# Patient Record
Sex: FEMALE | Race: WHITE | NOT HISPANIC OR LATINO | Employment: OTHER | URBAN - METROPOLITAN AREA
[De-identification: names, ages, dates, MRNs, and addresses within clinical notes are randomized per-mention and may not be internally consistent; named-entity substitution may affect disease eponyms.]

---

## 2017-01-19 RX ORDER — GABAPENTIN 300 MG/1
300 CAPSULE ORAL 2 TIMES DAILY
Qty: 180 CAPSULE | Refills: 2 | Status: SHIPPED | OUTPATIENT
Start: 2017-01-19 | End: 2017-05-24

## 2017-01-19 NOTE — TELEPHONE ENCOUNTER
Spoke with the patient.  She notes that the gabapentin 100 mg BID has been helping.  She also notes that she would like a stronger dose to see if she can get more relief.    Advised the patient to increase her gabapentin to 300 mg BID. Also advised that she could do 100/300 if she begins having too many side effects.    She will call back next week with an update.

## 2017-02-22 ENCOUNTER — OFFICE VISIT (OUTPATIENT)
Dept: NEUROSURGERY | Facility: CLINIC | Age: 72
End: 2017-02-22

## 2017-02-22 VITALS
DIASTOLIC BLOOD PRESSURE: 81 MMHG | WEIGHT: 106 LBS | SYSTOLIC BLOOD PRESSURE: 152 MMHG | HEART RATE: 70 BPM | HEIGHT: 63 IN | BODY MASS INDEX: 18.78 KG/M2

## 2017-02-22 DIAGNOSIS — M41.86 OTHER FORMS OF SCOLIOSIS, LUMBAR REGION: ICD-10-CM

## 2017-02-22 DIAGNOSIS — M48.061 STENOSIS, SPINAL, LUMBAR: Primary | ICD-10-CM

## 2017-02-22 PROCEDURE — 99213 OFFICE O/P EST LOW 20 MIN: CPT | Performed by: NEUROLOGICAL SURGERY

## 2017-02-22 RX ORDER — ATORVASTATIN CALCIUM 20 MG/1
TABLET, FILM COATED ORAL
COMMUNITY
Start: 2017-01-03 | End: 2017-05-25 | Stop reason: SDUPTHER

## 2017-02-22 NOTE — PROGRESS NOTES
Subjective   Patient ID: Giselle Bundy is a 71 y.o. female is here today for follow-up of back pain.    At the patient's last visit, it was recommended that she continue on gabapentin 300 mg BID and she was encouraged to go back to the gym and start doing low impact exercises.    Today, the patient notes that she continues on gabapentin 300 mg BID and notes that this really helps her pain.    She notes that she does do water aerobics at the gym, but doesn't really go all the time.     She notes that she continues to do a lot of walking, noting that she will go about a mile, up and down hill.  She notes that when she gets home from walking, she has increased pain in her left hip.    She notes that when her dosage of gabapentin is increased, that she has increased side effects of lethargy.    Back Pain   This is a chronic problem. The current episode started more than 1 month ago. The problem occurs daily. The problem has been gradually improving since onset. Pertinent negatives include no fever.       The following portions of the patient's history were reviewed and updated as appropriate: allergies, current medications, past family history, past medical history, past social history, past surgical history and problem list.    Review of Systems   Constitutional: Negative for fever.   Musculoskeletal: Positive for back pain.   Neurological: Negative for syncope.   All other systems reviewed and are negative.    She is with her .  The increase dose of gabapentin at 300 mg twice a day seems to be helping a bit.  But she continues to have left buttock pain and occasionally left leg pain when walking.  She wants to try the epidural blocks which isn't unreasonable.  She has as lateral recess stenosis at L4-L5 I like to hold off and avoid surgery if possible but it comes down to it, we would need to do a myelogram to get a better anatomical picture.  But we'll try epidural blocks and have her stay on the gabapentin  as stated for now.  I went over guidelines for exercise and told to try exercises that involve being the seated position like recumbent bicycle.    Objective   Physical Exam   Constitutional: She is oriented to person, place, and time. She appears well-developed and well-nourished.   HENT:   Head: Normocephalic and atraumatic.   Eyes: Conjunctivae and EOM are normal. Pupils are equal, round, and reactive to light.   Fundoscopic exam:       The right eye shows no papilledema. The right eye shows venous pulsations.        The left eye shows no papilledema. The left eye shows venous pulsations.   Neck: Carotid bruit is not present.   Neurological: She is oriented to person, place, and time. She has a normal Finger-Nose-Finger Test and a normal Heel to Shin Test. Gait normal.   Reflex Scores:       Tricep reflexes are 2+ on the right side and 2+ on the left side.       Bicep reflexes are 2+ on the right side and 2+ on the left side.       Brachioradialis reflexes are 2+ on the right side and 2+ on the left side.       Patellar reflexes are 2+ on the right side and 2+ on the left side.       Achilles reflexes are 2+ on the right side and 2+ on the left side.  Psychiatric: Her speech is normal.     Neurologic Exam     Mental Status   Oriented to person, place, and time.   Registration of memory: Good recent and remote memory.   Attention: normal. Concentration: normal.   Speech: speech is normal   Level of consciousness: alert  Knowledge: consistent with education.     Cranial Nerves     CN II   Visual fields full to confrontation.   Visual acuity: normal    CN III, IV, VI   Pupils are equal, round, and reactive to light.  Extraocular motions are normal.     CN V   Facial sensation intact.   Right corneal reflex: normal  Left corneal reflex: normal    CN VII   Facial expression full, symmetric.   Right facial weakness: none  Left facial weakness: none    CN VIII   Hearing: intact    CN IX, X   Palate: symmetric    CN XI    Right sternocleidomastoid strength: normal  Left sternocleidomastoid strength: normal    CN XII   Tongue: not atrophic  Tongue deviation: none    Motor Exam   Muscle bulk: normal  Right arm tone: normal  Left arm tone: normal  Right leg tone: normal  Left leg tone: normal    Strength   Strength 5/5 except as noted.     Sensory Exam   Light touch normal.     Gait, Coordination, and Reflexes     Gait  Gait: normal    Coordination   Finger to nose coordination: normal  Heel to shin coordination: normal    Reflexes   Right brachioradialis: 2+  Left brachioradialis: 2+  Right biceps: 2+  Left biceps: 2+  Right triceps: 2+  Left triceps: 2+  Right patellar: 2+  Left patellar: 2+  Right achilles: 2+  Left achilles: 2+  Right : 2+  Left : 2+      Assessment/Plan   Independent Review of Radiographic Studies:    I reviewed her previous MRI done late last year which did show some thoraco-lumbar scoliosis and lateral recess stenosis on the left at L4-L5.  I agree with the report.      Medical Decision Making:    She will continue her gabapentin at 300 mg twice a day and we'll try some lumbar epidural blocks.  She will come back to the office in 3 months.  I gave her some guidelines on exercise and asked her to try the recumbent bicycle as a form of cardiovascular exercises.  Will regroup in 3 months.      Giselle was seen today for back pain.    Diagnoses and all orders for this visit:    Stenosis, spinal, lumbar  -     Epidural Block    Other forms of scoliosis, lumbar region  -     Epidural Block    Return in about 3 months (around 5/22/2017).

## 2017-03-07 ENCOUNTER — TELEPHONE (OUTPATIENT)
Dept: NEUROSURGERY | Facility: CLINIC | Age: 72
End: 2017-03-07

## 2017-03-07 NOTE — TELEPHONE ENCOUNTER
Spoke with the patient.  She notes that she is scheduled for TORI next week and also wants a hip injection from her orthopedic physician.  I recommended she call her orthopedic surgeon and let him know she is scheduled for TORI and figure out if he is also willing to do a hip injection.     She agreed with this plan and will call back with any other issues or concerns.

## 2017-03-16 ENCOUNTER — HOSPITAL ENCOUNTER (OUTPATIENT)
Dept: GENERAL RADIOLOGY | Facility: HOSPITAL | Age: 72
Discharge: HOME | End: 2017-03-16

## 2017-03-16 ENCOUNTER — TRANSCRIBE ORDERS (OUTPATIENT)
Dept: PAIN MEDICINE | Facility: HOSPITAL | Age: 72
End: 2017-03-16

## 2017-03-16 ENCOUNTER — ANESTHESIA EVENT (OUTPATIENT)
Dept: PAIN MEDICINE | Facility: HOSPITAL | Age: 72
End: 2017-03-16

## 2017-03-16 ENCOUNTER — ANESTHESIA (OUTPATIENT)
Dept: PAIN MEDICINE | Facility: HOSPITAL | Age: 72
End: 2017-03-16

## 2017-03-16 ENCOUNTER — HOSPITAL ENCOUNTER (OUTPATIENT)
Dept: PAIN MEDICINE | Facility: HOSPITAL | Age: 72
Discharge: HOME | End: 2017-03-16
Attending: NEUROLOGICAL SURGERY | Admitting: NEUROLOGICAL SURGERY

## 2017-03-16 VITALS
OXYGEN SATURATION: 94 % | DIASTOLIC BLOOD PRESSURE: 89 MMHG | SYSTOLIC BLOOD PRESSURE: 113 MMHG | BODY MASS INDEX: 18.96 KG/M2 | RESPIRATION RATE: 16 BRPM | HEIGHT: 63 IN | WEIGHT: 107 LBS | TEMPERATURE: 97.7 F | HEART RATE: 66 BPM

## 2017-03-16 DIAGNOSIS — M41.86 OTHER FORMS OF SCOLIOSIS, LUMBAR REGION: ICD-10-CM

## 2017-03-16 DIAGNOSIS — R52 PAIN: ICD-10-CM

## 2017-03-16 DIAGNOSIS — M48.061 STENOSIS, SPINAL, LUMBAR: Primary | ICD-10-CM

## 2017-03-16 DIAGNOSIS — M48.061 STENOSIS, SPINAL, LUMBAR: ICD-10-CM

## 2017-03-16 PROCEDURE — 25010000002 METHYLPREDNISOLONE PER 80 MG: Performed by: ANESTHESIOLOGY

## 2017-03-16 PROCEDURE — 77003 FLUOROGUIDE FOR SPINE INJECT: CPT

## 2017-03-16 PROCEDURE — 25010000002 MIDAZOLAM PER 1 MG: Performed by: ANESTHESIOLOGY

## 2017-03-16 PROCEDURE — 25010000002 FENTANYL CITRATE (PF) 100 MCG/2ML SOLUTION: Performed by: ANESTHESIOLOGY

## 2017-03-16 PROCEDURE — C1755 CATHETER, INTRASPINAL: HCPCS

## 2017-03-16 PROCEDURE — 0 IOPAMIDOL 41 % SOLUTION: Performed by: ANESTHESIOLOGY

## 2017-03-16 RX ORDER — FENTANYL CITRATE 50 UG/ML
50 INJECTION, SOLUTION INTRAMUSCULAR; INTRAVENOUS
Status: DISCONTINUED | OUTPATIENT
Start: 2017-03-16 | End: 2017-03-17 | Stop reason: HOSPADM

## 2017-03-16 RX ORDER — BUPIVACAINE HYDROCHLORIDE 2.5 MG/ML
30 INJECTION, SOLUTION EPIDURAL; INFILTRATION; INTRACAUDAL ONCE
Status: DISCONTINUED | OUTPATIENT
Start: 2017-03-16 | End: 2017-03-17 | Stop reason: HOSPADM

## 2017-03-16 RX ORDER — SODIUM CHLORIDE 0.9 % (FLUSH) 0.9 %
1-10 SYRINGE (ML) INJECTION AS NEEDED
Status: DISCONTINUED | OUTPATIENT
Start: 2017-03-16 | End: 2017-03-17 | Stop reason: HOSPADM

## 2017-03-16 RX ORDER — MIDAZOLAM HYDROCHLORIDE 1 MG/ML
1 INJECTION INTRAMUSCULAR; INTRAVENOUS
Status: DISCONTINUED | OUTPATIENT
Start: 2017-03-16 | End: 2017-03-17 | Stop reason: HOSPADM

## 2017-03-16 RX ORDER — LIDOCAINE HYDROCHLORIDE 10 MG/ML
1 INJECTION, SOLUTION INFILTRATION; PERINEURAL ONCE
Status: DISCONTINUED | OUTPATIENT
Start: 2017-03-16 | End: 2017-03-17 | Stop reason: HOSPADM

## 2017-03-16 RX ORDER — METHYLPREDNISOLONE ACETATE 80 MG/ML
80 INJECTION, SUSPENSION INTRA-ARTICULAR; INTRALESIONAL; INTRAMUSCULAR; SOFT TISSUE ONCE
Status: COMPLETED | OUTPATIENT
Start: 2017-03-16 | End: 2017-03-16

## 2017-03-16 RX ADMIN — FENTANYL CITRATE 50 MCG: 50 INJECTION INTRAMUSCULAR; INTRAVENOUS at 13:35

## 2017-03-16 RX ADMIN — METHYLPREDNISOLONE ACETATE 80 MG: 80 INJECTION, SUSPENSION INTRA-ARTICULAR; INTRALESIONAL; INTRAMUSCULAR; SOFT TISSUE at 13:38

## 2017-03-16 RX ADMIN — IOPAMIDOL 10 ML: 408 INJECTION, SOLUTION INTRATHECAL at 13:37

## 2017-03-16 RX ADMIN — MIDAZOLAM 1 MG: 1 INJECTION INTRAMUSCULAR; INTRAVENOUS at 13:35

## 2017-03-16 NOTE — H&P
[facility]    History and Physical    Patient Name: Giselle Bundy  :  1945  MRN:  [id number]  Date of Admission: 3/16/2017    Subjective     Patient is a 71 y.o. female presents with chief complaint of acute, chronic, moderate, severe low back and hips: left pain.  Onset of symptoms was gradual starting several months ago.  Symptoms are associated/aggravated by lifting, standing, straining or twisting. Symptoms improve with relaxation    The following portions of the patients history were reviewed and updated as appropriate: current medications, allergies, past medical history, past surgical history, past family history, past social history and problem list                Objective     Past Medical History:   Past Medical History   Diagnosis Date   • Anxiety    • Breast cancer    • GERD (gastroesophageal reflux disease)    • Hyperlipidemia    • Neuromuscular disorder      Past Surgical History:   Past Surgical History   Procedure Laterality Date   • Breast surgery     • Eye surgery     • Tubal abdominal ligation     • Tonsillectomy     • Rhinoplasty       Family History:   Family History   Problem Relation Age of Onset   • Dementia Mother    • Heart attack Father      Social History:   Social History   Substance Use Topics   • Smoking status: Never Smoker   • Smokeless tobacco: Never Used   • Alcohol use Yes      Comment: SOCIALLY       Vital Signs Range for the last 24 hours  Temperature:     Temp Source:     BP:     Pulse:     Respirations:     SPO2:     O2 Amount (l/min):     O2 Devices     Weight:           --------------------------------------------------------------------------------    Current Outpatient Prescriptions   Medication Sig Dispense Refill   • atorvastatin (LIPITOR) 20 MG tablet      • atorvastatin (LIPITOR) 40 MG tablet Take 40 mg by mouth Daily.     • Calcium Carbonate-Vit D-Min (CALCIUM 1200 PO) Take 1 tablet by mouth Daily.     • diclofenac (VOLTAREN) 1 % gel gel Apply 4 g  topically 3 (Three) Times a Day.     • esomeprazole (nexIUM) 20 MG capsule Take 20 mg by mouth Every Morning Before Breakfast.     • FLUoxetine (PROZAC) 40 MG capsule Take 40 mg by mouth Daily.     • gabapentin (NEURONTIN) 300 MG capsule Take 1 capsule by mouth 2 (Two) Times a Day. 180 capsule 2   • HYDROcodone-acetaminophen (VICODIN) 5-500 MG per tablet Take 1 tablet by mouth Every 8 (Eight) Hours As Needed for moderate pain (4-6).     • ibuprofen (ADVIL,MOTRIN) 800 MG tablet Take 800 mg by mouth Every 8 (Eight) Hours As Needed for mild pain (1-3).     • loratadine-pseudoephedrine (CLARITIN-D 24 HOUR)  MG per 24 hr tablet Take 1 tablet by mouth Daily.     • LORazepam (ATIVAN) 0.5 MG tablet Take 0.5 mg by mouth Every 8 (Eight) Hours As Needed for anxiety.     • magnesium oxide (MAG-OX) 400 MG tablet Take 400 mg by mouth Daily.     • NEXIUM 40 MG capsule      • traZODone (DESYREL) 50 MG tablet Take 25 mg by mouth Every Night.       Current Facility-Administered Medications   Medication Dose Route Frequency Provider Last Rate Last Dose   • bupivacaine PF (MARCAINE) 0.25 % injection 30 mL  30 mL Infiltration Once Teddy Perez MD       • FentaNYL Citrate (PF) (SUBLIMAZE) injection 50 mcg  50 mcg Intravenous Q5 Min PRN Teddy Perez MD       • iopamidol (ISOVUE-M 200) injection 41%  2 mL Injection Once in imaging Teddy Perez MD       • lidocaine (XYLOCAINE) 1 % injection 1 mL  1 mL Intradermal Once Teddy Perez MD       • methylPREDNISolone acetate (DEPO-medrol) injection 80 mg  80 mg Intramuscular Once Teddy Perez MD       • midazolam (VERSED) injection 1 mg  1 mg Intravenous Q5 Min PRN Teddy Perez MD       • sodium chloride 0.9 % flush 1-10 mL  1-10 mL Intravenous PRN Teddy Perez MD           --------------------------------------------------------------------------------  Assessment/Plan      Anesthesia Evaluation     Patient summary reviewed and  Nursing notes reviewed   no history of anesthetic complications:  NPO Status: > 6 hours   Airway   Mallampati: II  TM distance: >3 FB  Neck ROM: full  Dental - normal exam     Pulmonary - negative pulmonary ROS and normal exam   Cardiovascular - normal exam        Neuro/Psych- neuro exam normal  (+) numbness, psychiatric history Anxiety,    GI/Hepatic/Renal/Endo    (+)  GERD,     Musculoskeletal (-) normal exam    (+) back pain, radiculopathy  Abdominal  - normal exam   Substance History - negative use     OB/GYN          Other - negative ROS                              Diagnosis and Plan    Treatment Plan  ASA 2      Procedures: Lumbar Epidural Steroid Injection(LESI), With fluoroscopy,       Anesthetic plan and risks discussed with patient.          Diagnosis     * Lumbar radiculopathy [M54.16]     * Lumbar spinal stenosis [M48.06]

## 2017-03-16 NOTE — ANESTHESIA PROCEDURE NOTES
PAIN Epidural block    Patient location during procedure: pain clinic  Indication:procedure for pain  Performed By  Anesthesiologist: SHASHANK HUDSON  Preanesthetic Checklist  Completed: patient identified, site marked, surgical consent, pre-op evaluation, timeout performed, risks and benefits discussed and monitors and equipment checked  Additional Notes  Depomedrol - 80mg    Needle position confirmed by fluoroscopy and epidurogram using 2cc of [id number].    Diagnosis  Post-Op Diagnosis Codes:     * Lumbar radiculopathy (M54.16)     * Lumbar spinal stenosis (M48.06)    Epidural Block Prep:  Pt Position:prone  Sterile Tech:cap, gloves, mask and sterile barrier  Prep:chlorhexidine gluconate and isopropyl alcohol  Monitoring:blood pressure monitoring, continuous pulse oximetry and EKG  Epidural Block Procedure:  Approach:left paramedian  Guidance: fluoroscopy  Location:lumbar  Level:4-5  Needle Type:Tuohy  Needle Gauge:20  Aspiration:negative  Medications:  Depomedrol:80 mg  Preservative Free Saline:2mL  Isovue:2mL    Post Assessment:  Post-procedure: bandaide.  Pt Tolerance:patient tolerated the procedure well with no apparent complications  Complications:no

## 2017-04-13 ENCOUNTER — HOSPITAL ENCOUNTER (OUTPATIENT)
Dept: PAIN MEDICINE | Facility: HOSPITAL | Age: 72
Discharge: HOME | End: 2017-04-13
Admitting: NEUROLOGICAL SURGERY

## 2017-04-13 ENCOUNTER — ANESTHESIA EVENT (OUTPATIENT)
Dept: PAIN MEDICINE | Facility: HOSPITAL | Age: 72
End: 2017-04-13

## 2017-04-13 ENCOUNTER — ANESTHESIA (OUTPATIENT)
Dept: PAIN MEDICINE | Facility: HOSPITAL | Age: 72
End: 2017-04-13

## 2017-04-13 ENCOUNTER — HOSPITAL ENCOUNTER (OUTPATIENT)
Dept: GENERAL RADIOLOGY | Facility: HOSPITAL | Age: 72
Discharge: HOME | End: 2017-04-13

## 2017-04-13 VITALS
SYSTOLIC BLOOD PRESSURE: 145 MMHG | RESPIRATION RATE: 16 BRPM | HEART RATE: 70 BPM | OXYGEN SATURATION: 97 % | DIASTOLIC BLOOD PRESSURE: 96 MMHG

## 2017-04-13 DIAGNOSIS — M48.061 STENOSIS, SPINAL, LUMBAR: ICD-10-CM

## 2017-04-13 DIAGNOSIS — M41.86 OTHER FORMS OF SCOLIOSIS, LUMBAR REGION: ICD-10-CM

## 2017-04-13 DIAGNOSIS — R52 PAIN: ICD-10-CM

## 2017-04-13 PROCEDURE — 0 IOPAMIDOL 41 % SOLUTION: Performed by: ANESTHESIOLOGY

## 2017-04-13 PROCEDURE — C1755 CATHETER, INTRASPINAL: HCPCS

## 2017-04-13 PROCEDURE — 25010000002 MIDAZOLAM PER 1 MG: Performed by: ANESTHESIOLOGY

## 2017-04-13 PROCEDURE — 25010000002 METHYLPREDNISOLONE PER 80 MG: Performed by: ANESTHESIOLOGY

## 2017-04-13 PROCEDURE — 77003 FLUOROGUIDE FOR SPINE INJECT: CPT

## 2017-04-13 RX ORDER — SODIUM CHLORIDE 0.9 % (FLUSH) 0.9 %
1-10 SYRINGE (ML) INJECTION AS NEEDED
Status: DISCONTINUED | OUTPATIENT
Start: 2017-04-13 | End: 2017-04-14 | Stop reason: HOSPADM

## 2017-04-13 RX ORDER — METHYLPREDNISOLONE ACETATE 80 MG/ML
80 INJECTION, SUSPENSION INTRA-ARTICULAR; INTRALESIONAL; INTRAMUSCULAR; SOFT TISSUE ONCE
Status: COMPLETED | OUTPATIENT
Start: 2017-04-13 | End: 2017-04-13

## 2017-04-13 RX ORDER — MIDAZOLAM HYDROCHLORIDE 1 MG/ML
1 INJECTION INTRAMUSCULAR; INTRAVENOUS
Status: DISCONTINUED | OUTPATIENT
Start: 2017-04-13 | End: 2017-04-14 | Stop reason: HOSPADM

## 2017-04-13 RX ORDER — LIDOCAINE HYDROCHLORIDE 10 MG/ML
1 INJECTION, SOLUTION INFILTRATION; PERINEURAL ONCE
Status: DISCONTINUED | OUTPATIENT
Start: 2017-04-13 | End: 2017-04-14 | Stop reason: HOSPADM

## 2017-04-13 RX ORDER — FENTANYL CITRATE 50 UG/ML
50 INJECTION, SOLUTION INTRAMUSCULAR; INTRAVENOUS
Status: DISCONTINUED | OUTPATIENT
Start: 2017-04-13 | End: 2017-04-14 | Stop reason: HOSPADM

## 2017-04-13 RX ADMIN — METHYLPREDNISOLONE ACETATE 80 MG: 80 INJECTION, SUSPENSION INTRA-ARTICULAR; INTRALESIONAL; INTRAMUSCULAR; SOFT TISSUE at 14:10

## 2017-04-13 RX ADMIN — IOPAMIDOL 10 ML: 408 INJECTION, SOLUTION INTRATHECAL at 14:10

## 2017-04-13 RX ADMIN — MIDAZOLAM 1 MG: 1 INJECTION INTRAMUSCULAR; INTRAVENOUS at 14:08

## 2017-04-13 NOTE — INTERVAL H&P NOTE
[facility]  H&P reviewed. No changes since last visit.  Patient states   50-75% improvement since the last procedure/injection.  Helped for about 3 weeks  Diagnosis     * Lumbar spinal stenosis [M48.06]      Airway assessed since last visit. Airway class equals: 2.

## 2017-04-13 NOTE — PLAN OF CARE
Problem: Pain, Chronic (Adult)  Goal: Identify Related Risk Factors and Signs and Symptoms  Outcome: Unable to achieve outcome(s) by discharge Date Met:  04/13/17

## 2017-04-13 NOTE — ANESTHESIA PROCEDURE NOTES
PAIN Epidural block    Patient location during procedure: pain clinic  Indication:procedure for pain  Performed By  Anesthesiologist: FOREIGN MCDONALD  Preanesthetic Checklist  Completed: patient identified, site marked, surgical consent, pre-op evaluation, timeout performed, IV checked, risks and benefits discussed and monitors and equipment checked  Additional Notes  LDDD/LDD WITH FLUORO  Epidural Block Prep:  Pt Position:prone  Sterile Tech:cap, gloves, mask and sterile barrier  Prep:chlorhexidine gluconate and isopropyl alcohol  Monitoring:blood pressure monitoring, continuous pulse oximetry and EKG  Epidural Block Procedure:  Approach:right paramedian  Guidance: fluoroscopy  Location:lumbar  Level:4-5  Needle Type:Tuohy  Needle Gauge:20  Aspiration:negative  Medications:  Depomedrol:80  Preservative Free Saline:3mL  Isovue:2mL    Post Assessment:  Dressing:secured with tape  Pt Tolerance:patient tolerated the procedure well with no apparent complications  Complications:no

## 2017-04-13 NOTE — H&P (VIEW-ONLY)
[facility]    History and Physical    Patient Name: Giselle Bundy  :  1945  MRN:  [id number]  Date of Admission: 3/16/2017    Subjective     Patient is a 71 y.o. female presents with chief complaint of acute, chronic, moderate, severe low back and hips: left pain.  Onset of symptoms was gradual starting several months ago.  Symptoms are associated/aggravated by lifting, standing, straining or twisting. Symptoms improve with relaxation    The following portions of the patients history were reviewed and updated as appropriate: current medications, allergies, past medical history, past surgical history, past family history, past social history and problem list                Objective     Past Medical History:   Past Medical History   Diagnosis Date   • Anxiety    • Breast cancer    • GERD (gastroesophageal reflux disease)    • Hyperlipidemia    • Neuromuscular disorder      Past Surgical History:   Past Surgical History   Procedure Laterality Date   • Breast surgery     • Eye surgery     • Tubal abdominal ligation     • Tonsillectomy     • Rhinoplasty       Family History:   Family History   Problem Relation Age of Onset   • Dementia Mother    • Heart attack Father      Social History:   Social History   Substance Use Topics   • Smoking status: Never Smoker   • Smokeless tobacco: Never Used   • Alcohol use Yes      Comment: SOCIALLY       Vital Signs Range for the last 24 hours  Temperature:     Temp Source:     BP:     Pulse:     Respirations:     SPO2:     O2 Amount (l/min):     O2 Devices     Weight:           --------------------------------------------------------------------------------    Current Outpatient Prescriptions   Medication Sig Dispense Refill   • atorvastatin (LIPITOR) 20 MG tablet      • atorvastatin (LIPITOR) 40 MG tablet Take 40 mg by mouth Daily.     • Calcium Carbonate-Vit D-Min (CALCIUM 1200 PO) Take 1 tablet by mouth Daily.     • diclofenac (VOLTAREN) 1 % gel gel Apply 4 g  topically 3 (Three) Times a Day.     • esomeprazole (nexIUM) 20 MG capsule Take 20 mg by mouth Every Morning Before Breakfast.     • FLUoxetine (PROZAC) 40 MG capsule Take 40 mg by mouth Daily.     • gabapentin (NEURONTIN) 300 MG capsule Take 1 capsule by mouth 2 (Two) Times a Day. 180 capsule 2   • HYDROcodone-acetaminophen (VICODIN) 5-500 MG per tablet Take 1 tablet by mouth Every 8 (Eight) Hours As Needed for moderate pain (4-6).     • ibuprofen (ADVIL,MOTRIN) 800 MG tablet Take 800 mg by mouth Every 8 (Eight) Hours As Needed for mild pain (1-3).     • loratadine-pseudoephedrine (CLARITIN-D 24 HOUR)  MG per 24 hr tablet Take 1 tablet by mouth Daily.     • LORazepam (ATIVAN) 0.5 MG tablet Take 0.5 mg by mouth Every 8 (Eight) Hours As Needed for anxiety.     • magnesium oxide (MAG-OX) 400 MG tablet Take 400 mg by mouth Daily.     • NEXIUM 40 MG capsule      • traZODone (DESYREL) 50 MG tablet Take 25 mg by mouth Every Night.       Current Facility-Administered Medications   Medication Dose Route Frequency Provider Last Rate Last Dose   • bupivacaine PF (MARCAINE) 0.25 % injection 30 mL  30 mL Infiltration Once Teddy Perez MD       • FentaNYL Citrate (PF) (SUBLIMAZE) injection 50 mcg  50 mcg Intravenous Q5 Min PRN Teddy Perez MD       • iopamidol (ISOVUE-M 200) injection 41%  2 mL Injection Once in imaging Teddy Perez MD       • lidocaine (XYLOCAINE) 1 % injection 1 mL  1 mL Intradermal Once Teddy Perez MD       • methylPREDNISolone acetate (DEPO-medrol) injection 80 mg  80 mg Intramuscular Once Teddy Perez MD       • midazolam (VERSED) injection 1 mg  1 mg Intravenous Q5 Min PRN Teddy Perez MD       • sodium chloride 0.9 % flush 1-10 mL  1-10 mL Intravenous PRN Teddy Perez MD           --------------------------------------------------------------------------------  Assessment/Plan      Anesthesia Evaluation     Patient summary reviewed and  Nursing notes reviewed   no history of anesthetic complications:  NPO Status: > 6 hours   Airway   Mallampati: II  TM distance: >3 FB  Neck ROM: full  Dental - normal exam     Pulmonary - negative pulmonary ROS and normal exam   Cardiovascular - normal exam        Neuro/Psych- neuro exam normal  (+) numbness, psychiatric history Anxiety,    GI/Hepatic/Renal/Endo    (+)  GERD,     Musculoskeletal (-) normal exam    (+) back pain, radiculopathy  Abdominal  - normal exam   Substance History - negative use     OB/GYN          Other - negative ROS                              Diagnosis and Plan    Treatment Plan  ASA 2      Procedures: Lumbar Epidural Steroid Injection(LESI), With fluoroscopy,       Anesthetic plan and risks discussed with patient.          Diagnosis     * Lumbar radiculopathy [M54.16]     * Lumbar spinal stenosis [M48.06]

## 2017-04-13 NOTE — PLAN OF CARE
Problem: Pain, Chronic (Adult)  Goal: Acceptable Pain Control/Comfort Level  Outcome: Unable to achieve outcome(s) by discharge Date Met:  04/13/17

## 2017-05-16 ENCOUNTER — TRANSCRIBE ORDERS (OUTPATIENT)
Dept: PAIN MEDICINE | Facility: HOSPITAL | Age: 72
End: 2017-05-16

## 2017-05-16 DIAGNOSIS — M41.86 OTHER FORMS OF SCOLIOSIS, LUMBAR REGION: ICD-10-CM

## 2017-05-16 DIAGNOSIS — M48.061 STENOSIS, SPINAL, LUMBAR: Primary | ICD-10-CM

## 2017-05-24 ENCOUNTER — OFFICE VISIT (OUTPATIENT)
Dept: NEUROSURGERY | Facility: CLINIC | Age: 72
End: 2017-05-24

## 2017-05-24 VITALS
WEIGHT: 106 LBS | HEART RATE: 73 BPM | DIASTOLIC BLOOD PRESSURE: 89 MMHG | BODY MASS INDEX: 18.78 KG/M2 | HEIGHT: 63 IN | SYSTOLIC BLOOD PRESSURE: 161 MMHG

## 2017-05-24 DIAGNOSIS — M48.061 STENOSIS, SPINAL, LUMBAR: Primary | ICD-10-CM

## 2017-05-24 DIAGNOSIS — M41.86 OTHER FORMS OF SCOLIOSIS, LUMBAR REGION: ICD-10-CM

## 2017-05-24 PROCEDURE — 99213 OFFICE O/P EST LOW 20 MIN: CPT | Performed by: NEUROLOGICAL SURGERY

## 2017-05-24 RX ORDER — GABAPENTIN 100 MG/1
CAPSULE ORAL
Qty: 540 CAPSULE | Refills: 3 | Status: SHIPPED | OUTPATIENT
Start: 2017-05-24 | End: 2017-07-03

## 2017-05-25 ENCOUNTER — ANESTHESIA (OUTPATIENT)
Dept: PAIN MEDICINE | Facility: HOSPITAL | Age: 72
End: 2017-05-25

## 2017-05-25 ENCOUNTER — HOSPITAL ENCOUNTER (OUTPATIENT)
Dept: GENERAL RADIOLOGY | Facility: HOSPITAL | Age: 72
Discharge: HOME | End: 2017-05-25

## 2017-05-25 ENCOUNTER — HOSPITAL ENCOUNTER (OUTPATIENT)
Dept: PAIN MEDICINE | Facility: HOSPITAL | Age: 72
Discharge: HOME | End: 2017-05-25
Admitting: NEUROLOGICAL SURGERY

## 2017-05-25 ENCOUNTER — ANESTHESIA EVENT (OUTPATIENT)
Dept: PAIN MEDICINE | Facility: HOSPITAL | Age: 72
End: 2017-05-25

## 2017-05-25 VITALS
OXYGEN SATURATION: 99 % | RESPIRATION RATE: 1 BRPM | WEIGHT: 106 LBS | HEIGHT: 63 IN | TEMPERATURE: 98 F | SYSTOLIC BLOOD PRESSURE: 157 MMHG | BODY MASS INDEX: 18.78 KG/M2 | DIASTOLIC BLOOD PRESSURE: 85 MMHG | HEART RATE: 63 BPM

## 2017-05-25 DIAGNOSIS — M48.061 STENOSIS, SPINAL, LUMBAR: ICD-10-CM

## 2017-05-25 DIAGNOSIS — R52 PAIN: ICD-10-CM

## 2017-05-25 DIAGNOSIS — M41.86 OTHER FORMS OF SCOLIOSIS, LUMBAR REGION: ICD-10-CM

## 2017-05-25 PROCEDURE — 25010000002 METHYLPREDNISOLONE PER 80 MG: Performed by: ANESTHESIOLOGY

## 2017-05-25 PROCEDURE — 77003 FLUOROGUIDE FOR SPINE INJECT: CPT

## 2017-05-25 PROCEDURE — 25010000002 MIDAZOLAM PER 1 MG: Performed by: ANESTHESIOLOGY

## 2017-05-25 PROCEDURE — 0 IOPAMIDOL 41 % SOLUTION: Performed by: ANESTHESIOLOGY

## 2017-05-25 PROCEDURE — C1755 CATHETER, INTRASPINAL: HCPCS

## 2017-05-25 RX ORDER — LIDOCAINE HYDROCHLORIDE 10 MG/ML
0.5 INJECTION, SOLUTION INFILTRATION; PERINEURAL ONCE AS NEEDED
Status: DISCONTINUED | OUTPATIENT
Start: 2017-05-25 | End: 2017-05-26 | Stop reason: HOSPADM

## 2017-05-25 RX ORDER — LIDOCAINE HYDROCHLORIDE 10 MG/ML
1 INJECTION, SOLUTION INFILTRATION; PERINEURAL ONCE
Status: DISCONTINUED | OUTPATIENT
Start: 2017-05-25 | End: 2017-05-26 | Stop reason: HOSPADM

## 2017-05-25 RX ORDER — METHYLPREDNISOLONE ACETATE 80 MG/ML
80 INJECTION, SUSPENSION INTRA-ARTICULAR; INTRALESIONAL; INTRAMUSCULAR; SOFT TISSUE ONCE
Status: COMPLETED | OUTPATIENT
Start: 2017-05-25 | End: 2017-05-25

## 2017-05-25 RX ORDER — MIDAZOLAM HYDROCHLORIDE 1 MG/ML
1 INJECTION INTRAMUSCULAR; INTRAVENOUS
Status: DISCONTINUED | OUTPATIENT
Start: 2017-05-25 | End: 2017-05-26 | Stop reason: HOSPADM

## 2017-05-25 RX ORDER — SODIUM CHLORIDE 0.9 % (FLUSH) 0.9 %
1-10 SYRINGE (ML) INJECTION AS NEEDED
Status: DISCONTINUED | OUTPATIENT
Start: 2017-05-25 | End: 2017-05-26 | Stop reason: HOSPADM

## 2017-05-25 RX ADMIN — MIDAZOLAM HYDROCHLORIDE 1 MG: 1 INJECTION, SOLUTION INTRAMUSCULAR; INTRAVENOUS at 14:05

## 2017-05-25 RX ADMIN — IOPAMIDOL 10 ML: 408 INJECTION, SOLUTION INTRATHECAL at 14:08

## 2017-05-25 RX ADMIN — METHYLPREDNISOLONE ACETATE 80 MG: 80 INJECTION, SUSPENSION INTRA-ARTICULAR; INTRALESIONAL; INTRAMUSCULAR; SOFT TISSUE at 14:08

## 2017-07-03 ENCOUNTER — OFFICE VISIT (OUTPATIENT)
Dept: NEUROSURGERY | Facility: CLINIC | Age: 72
End: 2017-07-03

## 2017-07-03 VITALS
BODY MASS INDEX: 18.96 KG/M2 | HEIGHT: 63 IN | SYSTOLIC BLOOD PRESSURE: 121 MMHG | WEIGHT: 107 LBS | HEART RATE: 71 BPM | DIASTOLIC BLOOD PRESSURE: 74 MMHG

## 2017-07-03 DIAGNOSIS — M41.86 OTHER FORMS OF SCOLIOSIS, LUMBAR REGION: ICD-10-CM

## 2017-07-03 DIAGNOSIS — M48.061 STENOSIS, SPINAL, LUMBAR: Primary | ICD-10-CM

## 2017-07-03 PROCEDURE — 99213 OFFICE O/P EST LOW 20 MIN: CPT | Performed by: NEUROLOGICAL SURGERY

## 2017-07-03 RX ORDER — ATORVASTATIN CALCIUM 20 MG/1
40 TABLET, FILM COATED ORAL DAILY
COMMUNITY
Start: 2017-06-09

## 2017-07-03 RX ORDER — FLUOXETINE HYDROCHLORIDE 20 MG/1
40 CAPSULE ORAL EVERY MORNING
COMMUNITY
Start: 2017-06-14

## 2017-07-03 RX ORDER — GABAPENTIN 300 MG/1
300 CAPSULE ORAL 3 TIMES DAILY
Qty: 270 CAPSULE | Refills: 3 | Status: SHIPPED | OUTPATIENT
Start: 2017-07-03 | End: 2017-08-09 | Stop reason: SDUPTHER

## 2017-07-03 NOTE — PROGRESS NOTES
Subjective   Patient ID: Giselle Bundy is a 71 y.o. female is here today for follow-up of back pain.    At the patient's last visit, she was advised to increase her gabapentin to 200 mg BID.      Today, the patient notes that her pain symptoms are stable.  She notes that activity increases her pain.  She notes on days that she is less active, she has less pain.  She continues on the gabapentin 200 mg BID.    Back Pain   This is a chronic problem. The current episode started more than 1 month ago. The problem occurs daily. The problem is unchanged. Pertinent negatives include no fever.       The following portions of the patient's history were reviewed and updated as appropriate: allergies, current medications, past family history, past medical history, past social history, past surgical history and problem list.    Review of Systems   Constitutional: Negative for fever.   Musculoskeletal: Positive for back pain.   Neurological: Negative for syncope.   All other systems reviewed and are negative.  She is with her .  She got her third block since last visit.  She is now on 300 mg twice a day of gabapentin.  We talked about whether or not she is tolerating her symptoms and whether or not we move forward with myelography and surgical considerations.  She doesn't think she is at that point and wants to try and maximize the gabapentin.  We will try it now 3 times a day and regroup in 4 months.      Physical Exam   Constitutional: She is oriented to person, place, and time. She appears well-developed and well-nourished.   HENT:   Head: Normocephalic and atraumatic.   Eyes: Conjunctivae and EOM are normal. Pupils are equal, round, and reactive to light.   Fundoscopic exam:       The right eye shows no papilledema. The right eye shows venous pulsations.        The left eye shows no papilledema. The left eye shows venous pulsations.   Neck: Carotid bruit is not present.   Neurological: She is oriented to person,  place, and time. She has a normal Finger-Nose-Finger Test and a normal Heel to Shin Test. Gait normal.   Reflex Scores:       Tricep reflexes are 2+ on the right side and 2+ on the left side.       Bicep reflexes are 2+ on the right side and 2+ on the left side.       Brachioradialis reflexes are 2+ on the right side and 2+ on the left side.       Patellar reflexes are 2+ on the right side and 2+ on the left side.       Achilles reflexes are 2+ on the right side and 2+ on the left side.  Psychiatric: Her speech is normal.     Neurologic Exam     Mental Status   Oriented to person, place, and time.   Registration of memory: Good recent and remote memory.   Attention: normal. Concentration: normal.   Speech: speech is normal   Level of consciousness: alert  Knowledge: consistent with education.     Cranial Nerves     CN II   Visual fields full to confrontation.   Visual acuity: normal    CN III, IV, VI   Pupils are equal, round, and reactive to light.  Extraocular motions are normal.     CN V   Facial sensation intact.   Right corneal reflex: normal  Left corneal reflex: normal    CN VII   Facial expression full, symmetric.   Right facial weakness: none  Left facial weakness: none    CN VIII   Hearing: intact    CN IX, X   Palate: symmetric    CN XI   Right sternocleidomastoid strength: normal  Left sternocleidomastoid strength: normal    CN XII   Tongue: not atrophic  Tongue deviation: none    Motor Exam   Muscle bulk: normal  Right arm tone: normal  Left arm tone: normal  Right leg tone: normal  Left leg tone: normal    Strength   Strength 5/5 except as noted.     Sensory Exam   Light touch normal.     Gait, Coordination, and Reflexes     Gait  Gait: normal    Coordination   Finger to nose coordination: normal  Heel to shin coordination: normal    Reflexes   Right brachioradialis: 2+  Left brachioradialis: 2+  Right biceps: 2+  Left biceps: 2+  Right triceps: 2+  Left triceps: 2+  Right patellar: 2+  Left patellar:  2+  Right achilles: 2+  Left achilles: 2+  Right : 2+  Left : 2+      Assessment/Plan   Independent Review of Radiographic Studies:    I reviewed her previous MRI done 11/17/16 which shows the lateral recess stenosis at L4-L5 and L5-S1 on the left and scoliosis.  Agree with the report.      Medical Decision Making:    She doesn't feel that she is ready to proceed with myelography or so potentially surgery.  We will see her in 4 months.  We will increase her gabapentin to 300 mg 3 times a day.  We refilled her prescription.  We'll see if her pain is intractable next time.  If so then we will proceed with myelography and surgical considerations.      Giselle was seen today for back pain.    Diagnoses and all orders for this visit:    Stenosis, spinal, lumbar    Other forms of scoliosis, lumbar region    Other orders  -     gabapentin (NEURONTIN) 300 MG capsule; Take 1 capsule by mouth 3 (Three) Times a Day.    Return in about 4 months (around 11/3/2017).

## 2017-07-14 ENCOUNTER — TELEPHONE (OUTPATIENT)
Dept: NEUROSURGERY | Facility: CLINIC | Age: 72
End: 2017-07-14

## 2017-07-14 NOTE — TELEPHONE ENCOUNTER
Spoke with the patient.  She notes that since her visit here, she has had a few really bad days.  She had questions about a myelogram, which I answered.  We discussed RBA's of the procedure.    She would like to think about it before proceeding. She will call when she has decided.

## 2017-08-09 RX ORDER — GABAPENTIN 300 MG/1
300 CAPSULE ORAL 3 TIMES DAILY
Qty: 270 CAPSULE | Refills: 3 | OUTPATIENT
Start: 2017-08-09 | End: 2017-11-10 | Stop reason: SDUPTHER

## 2017-08-29 DIAGNOSIS — M54.5 CHRONIC LOW BACK PAIN, UNSPECIFIED BACK PAIN LATERALITY, WITH SCIATICA PRESENCE UNSPECIFIED: Primary | ICD-10-CM

## 2017-08-29 DIAGNOSIS — G89.29 CHRONIC LOW BACK PAIN, UNSPECIFIED BACK PAIN LATERALITY, WITH SCIATICA PRESENCE UNSPECIFIED: Primary | ICD-10-CM

## 2017-08-29 NOTE — PROGRESS NOTES
The patient called and has decided to proceed with myelogram.  I put the order in for this.  Discussed risks of the procedure.  I answered her questions.    She will call back once the myelogram is scheduled so she can get put on Dr. Craft's schedule for f/u.

## 2017-10-27 ENCOUNTER — HOSPITAL ENCOUNTER (OUTPATIENT)
Dept: GENERAL RADIOLOGY | Facility: HOSPITAL | Age: 72
Discharge: HOME | End: 2017-10-27
Attending: NEUROLOGICAL SURGERY

## 2017-10-27 ENCOUNTER — HOSPITAL ENCOUNTER (OUTPATIENT)
Dept: CT IMAGING | Facility: HOSPITAL | Age: 72
Discharge: HOME | End: 2017-10-27
Attending: NEUROLOGICAL SURGERY | Admitting: NEUROLOGICAL SURGERY

## 2017-10-27 VITALS
BODY MASS INDEX: 19.14 KG/M2 | RESPIRATION RATE: 18 BRPM | OXYGEN SATURATION: 97 % | WEIGHT: 108 LBS | HEIGHT: 63 IN | DIASTOLIC BLOOD PRESSURE: 81 MMHG | TEMPERATURE: 96.8 F | HEART RATE: 65 BPM | SYSTOLIC BLOOD PRESSURE: 150 MMHG

## 2017-10-27 DIAGNOSIS — M54.5 CHRONIC LOW BACK PAIN, UNSPECIFIED BACK PAIN LATERALITY, WITH SCIATICA PRESENCE UNSPECIFIED: ICD-10-CM

## 2017-10-27 DIAGNOSIS — G89.29 CHRONIC LOW BACK PAIN, UNSPECIFIED BACK PAIN LATERALITY, WITH SCIATICA PRESENCE UNSPECIFIED: ICD-10-CM

## 2017-10-27 PROCEDURE — 72240 MYELOGRAPHY NECK SPINE: CPT

## 2017-10-27 PROCEDURE — 0 IOPAMIDOL 41 % SOLUTION: Performed by: RADIOLOGY

## 2017-10-27 PROCEDURE — 72114 X-RAY EXAM L-S SPINE BENDING: CPT

## 2017-10-27 PROCEDURE — A9270 NON-COVERED ITEM OR SERVICE: HCPCS | Performed by: RADIOLOGY

## 2017-10-27 PROCEDURE — 63710000001 ALPRAZOLAM 0.5 MG TABLET: Performed by: RADIOLOGY

## 2017-10-27 PROCEDURE — 72131 CT LUMBAR SPINE W/O DYE: CPT

## 2017-10-27 RX ORDER — ALPRAZOLAM 0.5 MG/1
0.5 TABLET ORAL ONCE
Status: COMPLETED | OUTPATIENT
Start: 2017-10-27 | End: 2017-10-27

## 2017-10-27 RX ORDER — LIDOCAINE HYDROCHLORIDE 10 MG/ML
10 INJECTION, SOLUTION INFILTRATION; PERINEURAL ONCE
Status: COMPLETED | OUTPATIENT
Start: 2017-10-27 | End: 2017-10-27

## 2017-10-27 RX ORDER — IBUPROFEN 800 MG/1
800 TABLET ORAL EVERY 6 HOURS PRN
COMMUNITY
End: 2018-06-15 | Stop reason: HOSPADM

## 2017-10-27 RX ORDER — FLUNISOLIDE 0.25 MG/ML
2 SOLUTION NASAL DAILY PRN
COMMUNITY

## 2017-10-27 RX ADMIN — LIDOCAINE HYDROCHLORIDE 10 ML: 10 INJECTION, SOLUTION INFILTRATION; PERINEURAL at 09:55

## 2017-10-27 RX ADMIN — ALPRAZOLAM 0.5 MG: 0.5 TABLET ORAL at 08:55

## 2017-10-27 RX ADMIN — IOPAMIDOL 20 ML: 408 INJECTION, SOLUTION INTRATHECAL at 09:55

## 2017-10-27 NOTE — NURSING NOTE
Verbal and written D/C instructions given to patient and .  They voice understanding and are able to teach back D/C instructions.

## 2017-10-27 NOTE — DISCHARGE INSTRUCTIONS
EDUCATION /DISCHARGE INSTRUCTIONS:  A myelogram is a special radiology procedure of the spinal cord, spinal nerves and other related structures.  You will be awake during the examination.  An area of your lower back will be cleansed with an antiseptic solution.  The physician will inject a numbing medication in your lower back.  While your back is numb, a needle will be placed in the lower back area.  A small amount of spinal fluid may be withdrawn and sent to the lab if ordered by your physician. While the needle is in the back, an injection of a contrast material (xray dye) will be given through the needle.  The contrast material will allow the physician to see the spinal cord and spinal nerves.  Once injected, the needle will be removed and a band aid will be placed over the injection site.  The table will be tilted during the process to allow the contrast material to flow to particular areas in the spine.  Following the injection and xrays, you will be taken to the CT scan where more pictures will be taken. After the procedure is finished, the contrast material will be absorbed by your body and eliminated through your kidneys.  The radiologist will study and interpret your myelogram and send the results to your physician.    Procedure risks of a myelogram include, but are not limited to:  *  Bleeding   *  seizure  *  Infection   *  Headache, possibly severe requiring  *  Contrast reaction      a blood patch  *  Nerve or cord injury  *  Paralysis and death    Benefits of the procedure:  *  Best examination for delineating pathology related to spinal cord compression from a disc and/or nerve root compression    Alternatives to the procedure:  MRI - a non invasive procedure requiring intravenous contrast injection.  Cannot be done on patients with certain pacemakers or metal in the body.  MRI risks include possible reaction to the contrast material, movement of metal located in the body.  Benefit to MRI:   Non-invasive and usually painless procedure.  THIS EDUCATION INFORMATION WAS REVIEWED PRIOR TO THE PROCEDURE AND CONSENT. Patient initials __________________Time____0841_____________    Important information following your myelogram:  *  Lie down with your head elevated no more than 2 pillows high today & tonight  *  Tomorrow, lie down with 1 pillow all day and all night  *  Sit up to eat meals and use the bathroom, otherwise, lie down  *  Do not drive for 48 hours following a myelogram  *  You may remove the bandage and shower in the morning  *  Increase your fluids for the next 24 hours.  Caffeinated drinks are encouraged.     Resume taking your blood thinner or Aspirin on _No Aspirin for 24 hours after myelogram___________________________    Resume taking antipsychotics/antidepressant on __Fluoxetine (Prozac) Trazadone (Deseryl)_on 10/28/17 after 12 noon______________________    If you have problems with a headache that is not relieved with rest and medication, please call the Radiology Triage Nurse desk  [phone]

## 2017-11-10 ENCOUNTER — OFFICE VISIT (OUTPATIENT)
Dept: NEUROSURGERY | Facility: CLINIC | Age: 72
End: 2017-11-10

## 2017-11-10 VITALS
BODY MASS INDEX: 19.14 KG/M2 | HEART RATE: 74 BPM | HEIGHT: 63 IN | WEIGHT: 108 LBS | SYSTOLIC BLOOD PRESSURE: 160 MMHG | DIASTOLIC BLOOD PRESSURE: 93 MMHG

## 2017-11-10 DIAGNOSIS — G89.29 CHRONIC LOW BACK PAIN, UNSPECIFIED BACK PAIN LATERALITY, WITH SCIATICA PRESENCE UNSPECIFIED: Primary | ICD-10-CM

## 2017-11-10 DIAGNOSIS — M48.062 SPINAL STENOSIS OF LUMBAR REGION WITH NEUROGENIC CLAUDICATION: ICD-10-CM

## 2017-11-10 DIAGNOSIS — M41.86 OTHER FORMS OF SCOLIOSIS, LUMBAR REGION: ICD-10-CM

## 2017-11-10 DIAGNOSIS — M54.5 CHRONIC LOW BACK PAIN, UNSPECIFIED BACK PAIN LATERALITY, WITH SCIATICA PRESENCE UNSPECIFIED: Primary | ICD-10-CM

## 2017-11-10 PROBLEM — M54.50 CHRONIC LOW BACK PAIN: Status: ACTIVE | Noted: 2017-11-10

## 2017-11-10 PROCEDURE — 99213 OFFICE O/P EST LOW 20 MIN: CPT | Performed by: NEUROLOGICAL SURGERY

## 2017-11-10 RX ORDER — GABAPENTIN 300 MG/1
300 CAPSULE ORAL 3 TIMES DAILY
Qty: 90 CAPSULE | Refills: 4 | Status: SHIPPED | OUTPATIENT
Start: 2017-11-10 | End: 2018-03-12 | Stop reason: SDUPTHER

## 2017-11-10 NOTE — PROGRESS NOTES
Subjective   Patient ID: Giselle Bundy is a 72 y.o. female is here today for follow-up on back pain.    At the patient's last visit she reported an increase in her pain when she was active.     Today the patient is here with a new lumbar spine myelogram. She denies having any complications from the myelogram. She reports that she get relief from the Gabapentin 300 mg TID. She states that her pain has been manageable.     Back Pain   This is a chronic problem. The current episode started more than 1 year ago. The problem occurs daily. The problem has been gradually improving since onset. Pertinent negatives include no bladder incontinence, bowel incontinence, leg pain, numbness, tingling or weakness.       The following portions of the patient's history were reviewed and updated as appropriate: allergies, current medications, past family history, past medical history, past social history, past surgical history and problem list.    Review of Systems   Gastrointestinal: Negative for bowel incontinence.   Genitourinary: Negative for bladder incontinence.   Musculoskeletal: Positive for back pain.   Neurological: Negative for tingling, weakness and numbness.   All other systems reviewed and are negative.    She is with her . She went through the myelogram after all. We discussed it. She does have a rather pronounced degree of scoliosis but also some moderate to early severe spinal stenosis at L3-L4 and L4-L5. I think that is what is causing her left hip and leg pain, but since I saw her she went on an increased dose of gabapentin at 300 mg 3 times a day and seems to be better. In fact she really does not have a lot of pain today. Her  says that she is not quite as alert as she used to be but the patient feels she is still functional on that dose. I told her I was reluctant, given her improvement and the fact that she is better and the fact that she still remains functional despite some mild effects, to  move forward with any kind of surgery. She agreed at this point, although her  voiced his concern again about her ability to drive while on this medicine. I told her that she could alternate between 2 to 3 times a day or even take 1 capsule in the morning and 2 capsules in the evening, but in any event we decided to stick with nonoperative treatment. We can always revisit the issue of surgery later on again if she worsens. She will come back in 4 months.       Objective   Physical Exam   Constitutional: She is oriented to person, place, and time. She appears well-developed and well-nourished.   HENT:   Head: Normocephalic and atraumatic.   Eyes: Conjunctivae and EOM are normal. Pupils are equal, round, and reactive to light.   Fundoscopic exam:       The right eye shows no papilledema. The right eye shows venous pulsations.        The left eye shows no papilledema. The left eye shows venous pulsations.   Neck: Carotid bruit is not present.   Neurological: She is oriented to person, place, and time. She has a normal Finger-Nose-Finger Test and a normal Heel to Shin Test. Gait normal.   Reflex Scores:       Tricep reflexes are 2+ on the right side and 2+ on the left side.       Bicep reflexes are 2+ on the right side and 2+ on the left side.       Brachioradialis reflexes are 2+ on the right side and 2+ on the left side.       Patellar reflexes are 2+ on the right side and 2+ on the left side.       Achilles reflexes are 2+ on the right side and 2+ on the left side.  Psychiatric: Her speech is normal.     Neurologic Exam     Mental Status   Oriented to person, place, and time.   Registration of memory: Good recent and remote memory.   Attention: normal. Concentration: normal.   Speech: speech is normal   Level of consciousness: alert  Knowledge: consistent with education.     Cranial Nerves     CN II   Visual fields full to confrontation.   Visual acuity: normal    CN III, IV, VI   Pupils are equal, round, and  reactive to light.  Extraocular motions are normal.     CN V   Facial sensation intact.   Right corneal reflex: normal  Left corneal reflex: normal    CN VII   Facial expression full, symmetric.   Right facial weakness: none  Left facial weakness: none    CN VIII   Hearing: intact    CN IX, X   Palate: symmetric    CN XI   Right sternocleidomastoid strength: normal  Left sternocleidomastoid strength: normal    CN XII   Tongue: not atrophic  Tongue deviation: none    Motor Exam   Muscle bulk: normal  Right arm tone: normal  Left arm tone: normal  Right leg tone: normal  Left leg tone: normal    Strength   Strength 5/5 except as noted.     Sensory Exam   Light touch normal.     Gait, Coordination, and Reflexes     Gait  Gait: normal    Coordination   Finger to nose coordination: normal  Heel to shin coordination: normal    Reflexes   Right brachioradialis: 2+  Left brachioradialis: 2+  Right biceps: 2+  Left biceps: 2+  Right triceps: 2+  Left triceps: 2+  Right patellar: 2+  Left patellar: 2+  Right achilles: 2+  Left achilles: 2+  Right : 2+  Left : 2+      Assessment/Plan   Independent Review of Radiographic Studies:    I reviewed her myelogram done 10/27/2017 which does show rather significant scoliosis and mild to moderate to early severe spinal stenosis at L3-L4 and L4-L5. I agree with the report.       Medical Decision Making:    Again, I think we should stick with nonoperative treatments since she is feeling better on the increased dose of gabapentin despite some side effects. She will continue to work on her exercises and come and see me in 4 months. If things worsen between now and then she will let me know.       Giselle was seen today for back pain.    Diagnoses and all orders for this visit:    Chronic low back pain, unspecified back pain laterality, with sciatica presence unspecified    Spinal stenosis of lumbar region with neurogenic claudication    Other forms of scoliosis, lumbar  region    Return in about 4 months (around 3/10/2018).

## 2018-03-12 ENCOUNTER — OFFICE VISIT (OUTPATIENT)
Dept: NEUROSURGERY | Facility: CLINIC | Age: 73
End: 2018-03-12

## 2018-03-12 VITALS
DIASTOLIC BLOOD PRESSURE: 79 MMHG | BODY MASS INDEX: 19.14 KG/M2 | SYSTOLIC BLOOD PRESSURE: 152 MMHG | HEART RATE: 88 BPM | HEIGHT: 63 IN | WEIGHT: 108 LBS

## 2018-03-12 DIAGNOSIS — M41.86 OTHER FORMS OF SCOLIOSIS, LUMBAR REGION: ICD-10-CM

## 2018-03-12 DIAGNOSIS — M54.5 CHRONIC LOW BACK PAIN, UNSPECIFIED BACK PAIN LATERALITY, WITH SCIATICA PRESENCE UNSPECIFIED: Primary | ICD-10-CM

## 2018-03-12 DIAGNOSIS — G89.29 CHRONIC LOW BACK PAIN, UNSPECIFIED BACK PAIN LATERALITY, WITH SCIATICA PRESENCE UNSPECIFIED: Primary | ICD-10-CM

## 2018-03-12 DIAGNOSIS — M48.062 SPINAL STENOSIS OF LUMBAR REGION WITH NEUROGENIC CLAUDICATION: ICD-10-CM

## 2018-03-12 PROCEDURE — 99213 OFFICE O/P EST LOW 20 MIN: CPT | Performed by: NEUROLOGICAL SURGERY

## 2018-03-12 RX ORDER — GABAPENTIN 300 MG/1
300 CAPSULE ORAL 3 TIMES DAILY
Qty: 270 CAPSULE | Refills: 4 | Status: SHIPPED | OUTPATIENT
Start: 2018-03-12 | End: 2018-11-20 | Stop reason: SDUPTHER

## 2018-03-12 NOTE — PROGRESS NOTES
Subjective   Patient ID: Giselle Bundy is a 72 y.o. female is here today for follow-up on back pain.    At the patient's last visit she reported that her pain had been manageable.     Today the patient reports that she has been doing well since the last visit. She states that she has been going to the gym and using the recumbent bicycle and the elliptical. She has noticed some intermittent tingling in the right shoulder when sleeping and shooting pains in the right heel.    Back Pain   This is a chronic problem. The current episode started more than 1 month ago. The problem occurs daily. The problem has been waxing and waning since onset. The pain is present in the lumbar spine. Associated symptoms include tingling. Pertinent negatives include no bladder incontinence or bowel incontinence.       The following portions of the patient's history were reviewed and updated as appropriate: allergies, current medications, past family history, past medical history, past social history, past surgical history and problem list.    Review of Systems   Gastrointestinal: Negative for bowel incontinence.   Genitourinary: Negative for bladder incontinence.   Musculoskeletal: Positive for back pain.   Neurological: Positive for tingling.   All other systems reviewed and are negative.    It has been 4 months since I have seen her.  She got back into her regular exercise program and is doing reasonable well. She wants to do more including yoga and Pilates and I said that was fine as long as she takes it very slowly.  She does not really have much back pain and she does have some left buttock and leg pain, but it is manageable right now.  I suggested that we stay away from surgery.  I am concerned that even if we tried to decompress her in the presence of her scoliosis, we could create more problems than help.  She is quite functional given that she goes to the gym 2 or 3 times a week, so I told her we will stick with the current  course and see her again in 6 months.  We will refill the gabapentin.      Objective   Physical Exam   Constitutional: She is oriented to person, place, and time. She appears well-developed and well-nourished.   HENT:   Head: Normocephalic and atraumatic.   Eyes: Conjunctivae and EOM are normal. Pupils are equal, round, and reactive to light.   Fundoscopic exam:       The right eye shows no papilledema. The right eye shows venous pulsations.        The left eye shows no papilledema. The left eye shows venous pulsations.   Neck: Carotid bruit is not present.   Neurological: She is oriented to person, place, and time. She has a normal Finger-Nose-Finger Test and a normal Heel to Shin Test. Gait normal.   Reflex Scores:       Tricep reflexes are 2+ on the right side and 2+ on the left side.       Bicep reflexes are 2+ on the right side and 2+ on the left side.       Brachioradialis reflexes are 2+ on the right side and 2+ on the left side.       Patellar reflexes are 2+ on the right side and 2+ on the left side.       Achilles reflexes are 2+ on the right side and 2+ on the left side.  Psychiatric: Her speech is normal.     Neurologic Exam     Mental Status   Oriented to person, place, and time.   Registration of memory: Good recent and remote memory.   Attention: normal. Concentration: normal.   Speech: speech is normal   Level of consciousness: alert  Knowledge: consistent with education.     Cranial Nerves     CN II   Visual fields full to confrontation.   Visual acuity: normal    CN III, IV, VI   Pupils are equal, round, and reactive to light.  Extraocular motions are normal.     CN V   Facial sensation intact.   Right corneal reflex: normal  Left corneal reflex: normal    CN VII   Facial expression full, symmetric.   Right facial weakness: none  Left facial weakness: none    CN VIII   Hearing: intact    CN IX, X   Palate: symmetric    CN XI   Right sternocleidomastoid strength: normal  Left sternocleidomastoid  strength: normal    CN XII   Tongue: not atrophic  Tongue deviation: none    Motor Exam   Muscle bulk: normal  Right arm tone: normal  Left arm tone: normal  Right leg tone: normal  Left leg tone: normal    Strength   Strength 5/5 except as noted.     Sensory Exam   Light touch normal.     Gait, Coordination, and Reflexes     Gait  Gait: normal    Coordination   Finger to nose coordination: normal  Heel to shin coordination: normal    Reflexes   Right brachioradialis: 2+  Left brachioradialis: 2+  Right biceps: 2+  Left biceps: 2+  Right triceps: 2+  Left triceps: 2+  Right patellar: 2+  Left patellar: 2+  Right achilles: 2+  Left achilles: 2+  Right : 2+  Left : 2+      Assessment/Plan   Independent Review of Radiographic Studies:    I reviewed her myelogram done 10/27/17 which shows rather significant lumbar scoliosis and moderate to early severe spinal stenosis at L3-L4 and L4-L5.  Agree with the report.      Medical Decision Making:    We will continue managing her nonoperatively.  She'll stay on her gabapentin at 300 mg 3 times a day and I gave her a new prescription for 3 month supply.  She'll continue doing her exercises and see me in 6 months.      Giselle was seen today for back pain.    Diagnoses and all orders for this visit:    Chronic low back pain, unspecified back pain laterality, with sciatica presence unspecified    Other forms of scoliosis, lumbar region    Spinal stenosis of lumbar region with neurogenic claudication    Other orders  -     gabapentin (NEURONTIN) 300 MG capsule; Take 1 capsule by mouth 3 (Three) Times a Day. 3 month supply      Return in about 6 months (around 9/12/2018).

## 2018-05-10 NOTE — PROGRESS NOTES
Subjective   Patient ID: Giselle Bundy is a 72 y.o. female is here today for follow-up on back pain.    At the patient's last visit she reported that she had been doing well. She was exercising and using the recumbent bicycle and the elliptical.    Today the patient reports numbness and tingling radiating down her left leg from the hip to the toes.    Back Pain   This is a chronic problem. The current episode started more than 1 year ago. The problem occurs intermittently. The pain is present in the lumbar spine. Radiates to: left hip. Associated symptoms include leg pain, numbness and tingling. Pertinent negatives include no bladder incontinence, bowel incontinence or weakness.       The following portions of the patient's history were reviewed and updated as appropriate: allergies, current medications, past family history, past medical history, past social history, past surgical history and problem list.    Review of Systems   Gastrointestinal: Negative for bowel incontinence.   Genitourinary: Negative for bladder incontinence.   Musculoskeletal: Positive for back pain.   Neurological: Positive for tingling and numbness. Negative for weakness.   All other systems reviewed and are negative.    I have been following this patient for several years. She has not had surgery; in fact, we have been trying to avoid it. She has scoliosis and spinal stenosis. Last time I saw her, which was 2 months ago, she was doing well but she was probably overextending herself gardening, doing mowing activities, and began having increased left buttock pain and left leg numbness from her stenosis. She has no motor deficits. I told her this was still not the time to consider surgery. We will try a Medrol Dosepak. She will remain on gabapentin at 300 mg t.i.d. and come back in 3 weeks. I think this should help.        Objective   Physical Exam   Constitutional: She is oriented to person, place, and time. She appears well-developed and  well-nourished.   HENT:   Head: Normocephalic and atraumatic.   Eyes: Conjunctivae and EOM are normal. Pupils are equal, round, and reactive to light.   Fundoscopic exam:       The right eye shows no papilledema. The right eye shows venous pulsations.        The left eye shows no papilledema. The left eye shows venous pulsations.   Neck: Carotid bruit is not present.   Neurological: She is oriented to person, place, and time. She has a normal Finger-Nose-Finger Test and a normal Heel to Shin Test. Gait normal.   Reflex Scores:       Tricep reflexes are 2+ on the right side and 2+ on the left side.       Bicep reflexes are 2+ on the right side and 2+ on the left side.       Brachioradialis reflexes are 2+ on the right side and 2+ on the left side.       Patellar reflexes are 2+ on the right side and 2+ on the left side.       Achilles reflexes are 2+ on the right side and 2+ on the left side.  Psychiatric: Her speech is normal.     Neurologic Exam     Mental Status   Oriented to person, place, and time.   Registration of memory: Good recent and remote memory.   Attention: normal. Concentration: normal.   Speech: speech is normal   Level of consciousness: alert  Knowledge: consistent with education.     Cranial Nerves     CN II   Visual fields full to confrontation.   Visual acuity: normal    CN III, IV, VI   Pupils are equal, round, and reactive to light.  Extraocular motions are normal.     CN V   Facial sensation intact.   Right corneal reflex: normal  Left corneal reflex: normal    CN VII   Facial expression full, symmetric.   Right facial weakness: none  Left facial weakness: none    CN VIII   Hearing: intact    CN IX, X   Palate: symmetric    CN XI   Right sternocleidomastoid strength: normal  Left sternocleidomastoid strength: normal    CN XII   Tongue: not atrophic  Tongue deviation: none    Motor Exam   Muscle bulk: normal  Right arm tone: normal  Left arm tone: normal  Right leg tone: normal  Left leg tone:  normal    Strength   Strength 5/5 except as noted.     Sensory Exam   Light touch normal.     Gait, Coordination, and Reflexes     Gait  Gait: normal    Coordination   Finger to nose coordination: normal  Heel to shin coordination: normal    Reflexes   Right brachioradialis: 2+  Left brachioradialis: 2+  Right biceps: 2+  Left biceps: 2+  Right triceps: 2+  Left triceps: 2+  Right patellar: 2+  Left patellar: 2+  Right achilles: 2+  Left achilles: 2+  Right : 2+  Left : 2+      Assessment/Plan   Independent Review of Radiographic Studies:    The myelogram done 10/27/17 which shows rather significant lumbar scoliosis and moderate to early severe spinal stenosis at L3-L4 and L4-L5.  Agree with the report.      Medical Decision Making:    Again, this is not the time for surgery.  I think a Medrol Dosepak should be helpful.  We'll have her come back in 3 weeks for reassessment.  I told her to back off a little bit on the gardening work and working out at the gym.      Giselle was seen today for back pain.    Diagnoses and all orders for this visit:    Spinal stenosis of lumbar region with neurogenic claudication    Other forms of scoliosis, lumbar region    Chronic low back pain, unspecified back pain laterality, with sciatica presence unspecified      Return in about 3 weeks (around 6/1/2018).

## 2018-05-11 ENCOUNTER — OFFICE VISIT (OUTPATIENT)
Dept: NEUROSURGERY | Facility: CLINIC | Age: 73
End: 2018-05-11

## 2018-05-11 VITALS
BODY MASS INDEX: 19.14 KG/M2 | HEART RATE: 75 BPM | WEIGHT: 108 LBS | DIASTOLIC BLOOD PRESSURE: 78 MMHG | HEIGHT: 63 IN | SYSTOLIC BLOOD PRESSURE: 140 MMHG

## 2018-05-11 DIAGNOSIS — M54.5 CHRONIC LOW BACK PAIN, UNSPECIFIED BACK PAIN LATERALITY, WITH SCIATICA PRESENCE UNSPECIFIED: ICD-10-CM

## 2018-05-11 DIAGNOSIS — G89.29 CHRONIC LOW BACK PAIN, UNSPECIFIED BACK PAIN LATERALITY, WITH SCIATICA PRESENCE UNSPECIFIED: ICD-10-CM

## 2018-05-11 DIAGNOSIS — M48.062 SPINAL STENOSIS OF LUMBAR REGION WITH NEUROGENIC CLAUDICATION: Primary | ICD-10-CM

## 2018-05-11 DIAGNOSIS — M41.86 OTHER FORMS OF SCOLIOSIS, LUMBAR REGION: ICD-10-CM

## 2018-05-11 PROCEDURE — 99213 OFFICE O/P EST LOW 20 MIN: CPT | Performed by: NEUROLOGICAL SURGERY

## 2018-05-11 RX ORDER — METHYLPREDNISOLONE 4 MG/1
TABLET ORAL
Qty: 21 TABLET | Refills: 0 | Status: SHIPPED | OUTPATIENT
Start: 2018-05-11 | End: 2018-05-11 | Stop reason: SDUPTHER

## 2018-05-11 RX ORDER — PANTOPRAZOLE SODIUM 40 MG/1
40 TABLET, DELAYED RELEASE ORAL
COMMUNITY
Start: 2018-05-03 | End: 2018-06-08

## 2018-05-11 RX ORDER — METHYLPREDNISOLONE 4 MG/1
TABLET ORAL
Qty: 21 TABLET | Refills: 0 | Status: SHIPPED | OUTPATIENT
Start: 2018-05-11 | End: 2018-06-08

## 2018-05-24 NOTE — PROGRESS NOTES
Subjective   Patient ID: Giselle Bundy is a 72 y.o. female is here today for follow-up on back pain.    At the last visit the patient the patient reported numbness and tingling radiating down the left leg from the hip to the toes. She was given a MDP at the last visit.    Today the patient reports that she has completed the MDP whit short term relief. There have been no changes with her symptoms since the last visit.     Back Pain   This is a chronic problem. The current episode started more than 1 year ago. The problem occurs intermittently. The problem is unchanged. The pain is present in the lumbar spine. Radiates to: left hip. Associated symptoms include leg pain, numbness and tingling. Pertinent negatives include no bladder incontinence or bowel incontinence.       The following portions of the patient's history were reviewed and updated as appropriate: allergies, current medications, past family history, past medical history, past social history, past surgical history and problem list.    Review of Systems   Gastrointestinal: Negative for bowel incontinence.   Genitourinary: Negative for bladder incontinence.   Musculoskeletal: Positive for back pain.   Neurological: Positive for tingling and numbness.   All other systems reviewed and are negative.    I have been following this patient for known scoliosis and laterally recessed stenosis at L4-L5 for several years. We have been trying to avoid surgery. She is on gabapentin at 300 mg t.i.d. The blocks last year only helped modestly. She feels she is at her worst at this point. We tried the Medrol Dosepak and it only helped while she was on it. She has left buttock and leg pain, surprisingly very low back pain, nothing on the right. We have her imaging studies and we are at a point where she feels she wants to move forward with surgery. We have been trying to avoid surgery because I did not want to make her scoliosis worse, but if we do a minimally invasive  left L4-L5 METRx microdecompression, I think that will help her symptoms without worsening her underlying scoliosis. I described that and she wants to move forward. She is very active and we hope she can resume some semblance of exercise afterwards. We can also wean her gabapentin afterwards. Now it is up to 300 mg t.i.d.      Objective   Physical Exam   Constitutional: She is oriented to person, place, and time. She appears well-developed and well-nourished.   HENT:   Head: Normocephalic and atraumatic.   Eyes: Conjunctivae and EOM are normal. Pupils are equal, round, and reactive to light.   Fundoscopic exam:       The right eye shows no papilledema. The right eye shows venous pulsations.        The left eye shows no papilledema. The left eye shows venous pulsations.   Neck: Carotid bruit is not present.   Neurological: She is oriented to person, place, and time. She has a normal Finger-Nose-Finger Test and a normal Heel to Shin Test. Gait normal.   Reflex Scores:       Tricep reflexes are 2+ on the right side and 2+ on the left side.       Bicep reflexes are 2+ on the right side and 2+ on the left side.       Brachioradialis reflexes are 2+ on the right side and 2+ on the left side.       Patellar reflexes are 2+ on the right side and 2+ on the left side.       Achilles reflexes are 2+ on the right side and 2+ on the left side.  Psychiatric: Her speech is normal.     Neurologic Exam     Mental Status   Oriented to person, place, and time.   Registration of memory: Good recent and remote memory.   Attention: normal. Concentration: normal.   Speech: speech is normal   Level of consciousness: alert  Knowledge: consistent with education.     Cranial Nerves     CN II   Visual fields full to confrontation.   Visual acuity: normal    CN III, IV, VI   Pupils are equal, round, and reactive to light.  Extraocular motions are normal.     CN V   Facial sensation intact.   Right corneal reflex: normal  Left corneal reflex:  normal    CN VII   Facial expression full, symmetric.   Right facial weakness: none  Left facial weakness: none    CN VIII   Hearing: intact    CN IX, X   Palate: symmetric    CN XI   Right sternocleidomastoid strength: normal  Left sternocleidomastoid strength: normal    CN XII   Tongue: not atrophic  Tongue deviation: none    Motor Exam   Muscle bulk: normal  Right arm tone: normal  Left arm tone: normal  Right leg tone: normal  Left leg tone: normal    Strength   Strength 5/5 except as noted.     Sensory Exam   Light touch normal.     Gait, Coordination, and Reflexes     Gait  Gait: normal    Coordination   Finger to nose coordination: normal  Heel to shin coordination: normal    Reflexes   Right brachioradialis: 2+  Left brachioradialis: 2+  Right biceps: 2+  Left biceps: 2+  Right triceps: 2+  Left triceps: 2+  Right patellar: 2+  Left patellar: 2+  Right achilles: 2+  Left achilles: 2+  Right : 2+  Left : 2+      Assessment/Plan   Independent Review of Radiographic Studies:    Review the myelogram from August of last year which did show degenerative scoliosis and lateral recess stenosis that seems to be worse at L4-L5 towards the left.  It's been a moderate at L3-L4.  I agree with the report.      Medical Decision Making:    I described and recommended an outpatient left L4/5 Metrx microdecompression.  The goal of surgery is relief of radiating leg pain, and improvement of numbness, tingling, and weakness. This will not help or hinder midline low back pain. The risks include, but are not limited to, infection, hemorrhage requiring transfusion or reoperation, CSF leak requiring reoperation, incomplete relief of symptoms, potential need for additional surgery in the future, stroke, paralysis, coma, and death. The patient agrees to proceed.     Giselle was seen today for back pain.    Diagnoses and all orders for this visit:    Spinal stenosis of lumbar region with neurogenic claudication  -     Case  request; Standing  -     Case request    Other forms of scoliosis, lumbar region  -     Case request; Standing  -     Case request      Return for 2 weeks with S or L.

## 2018-05-30 ENCOUNTER — OFFICE VISIT (OUTPATIENT)
Dept: NEUROSURGERY | Facility: CLINIC | Age: 73
End: 2018-05-30

## 2018-05-30 VITALS
HEIGHT: 63 IN | SYSTOLIC BLOOD PRESSURE: 144 MMHG | WEIGHT: 108 LBS | DIASTOLIC BLOOD PRESSURE: 85 MMHG | HEART RATE: 76 BPM | BODY MASS INDEX: 19.14 KG/M2

## 2018-05-30 DIAGNOSIS — M41.86 OTHER FORMS OF SCOLIOSIS, LUMBAR REGION: ICD-10-CM

## 2018-05-30 DIAGNOSIS — M48.062 SPINAL STENOSIS OF LUMBAR REGION WITH NEUROGENIC CLAUDICATION: Primary | ICD-10-CM

## 2018-05-30 PROCEDURE — 99214 OFFICE O/P EST MOD 30 MIN: CPT | Performed by: NEUROLOGICAL SURGERY

## 2018-05-31 ENCOUNTER — TRANSCRIBE ORDERS (OUTPATIENT)
Dept: NEUROSURGERY | Facility: CLINIC | Age: 73
End: 2018-05-31

## 2018-05-31 PROBLEM — M48.062 SPINAL STENOSIS OF LUMBAR REGION WITH NEUROGENIC CLAUDICATION: Status: ACTIVE | Noted: 2018-05-31

## 2018-06-08 ENCOUNTER — HOSPITAL ENCOUNTER (OUTPATIENT)
Dept: GENERAL RADIOLOGY | Facility: HOSPITAL | Age: 73
Discharge: HOME | End: 2018-06-08
Admitting: NEUROLOGICAL SURGERY

## 2018-06-08 ENCOUNTER — APPOINTMENT (OUTPATIENT)
Dept: PREADMISSION TESTING | Facility: HOSPITAL | Age: 73
End: 2018-06-08

## 2018-06-08 VITALS
BODY MASS INDEX: 19.49 KG/M2 | TEMPERATURE: 97.4 F | SYSTOLIC BLOOD PRESSURE: 123 MMHG | HEART RATE: 80 BPM | DIASTOLIC BLOOD PRESSURE: 68 MMHG | OXYGEN SATURATION: 97 % | WEIGHT: 110 LBS | HEIGHT: 63 IN

## 2018-06-08 LAB
ANION GAP SERPL CALCULATED.3IONS-SCNC: 13.8 MMOL/L
APTT PPP: 30.5 SECONDS (ref 22.7–35.4)
BACTERIA UR QL AUTO: ABNORMAL /HPF
BASOPHILS # BLD AUTO: 0.06 10*3/MM3 (ref 0–0.2)
BASOPHILS NFR BLD AUTO: 0.8 % (ref 0–1.5)
BILIRUB UR QL STRIP: NEGATIVE
BUN BLD-MCNC: 14 MG/DL (ref 8–23)
BUN/CREAT SERPL: 14.1 (ref 7–25)
CALCIUM SPEC-SCNC: 9.5 MG/DL (ref 8.6–10.5)
CHLORIDE SERPL-SCNC: 98 MMOL/L (ref 98–107)
CLARITY UR: CLEAR
CO2 SERPL-SCNC: 25.2 MMOL/L (ref 22–29)
COLOR UR: YELLOW
CREAT BLD-MCNC: 0.99 MG/DL (ref 0.57–1)
DEPRECATED RDW RBC AUTO: 45.9 FL (ref 37–54)
EOSINOPHIL # BLD AUTO: 0.29 10*3/MM3 (ref 0–0.7)
EOSINOPHIL NFR BLD AUTO: 4.1 % (ref 0.3–6.2)
ERYTHROCYTE [DISTWIDTH] IN BLOOD BY AUTOMATED COUNT: 13 % (ref 11.7–13)
GFR SERPL CREATININE-BSD FRML MDRD: 55 ML/MIN/1.73
GLUCOSE BLD-MCNC: 90 MG/DL (ref 65–99)
GLUCOSE UR STRIP-MCNC: NEGATIVE MG/DL
HCT VFR BLD AUTO: 39.5 % (ref 35.6–45.5)
HGB BLD-MCNC: 12.6 G/DL (ref 11.9–15.5)
HGB UR QL STRIP.AUTO: NEGATIVE
HYALINE CASTS UR QL AUTO: ABNORMAL /LPF
IMM GRANULOCYTES # BLD: 0.03 10*3/MM3 (ref 0–0.03)
IMM GRANULOCYTES NFR BLD: 0.4 % (ref 0–0.5)
INR PPP: 1.01 (ref 0.9–1.1)
KETONES UR QL STRIP: NEGATIVE
LEUKOCYTE ESTERASE UR QL STRIP.AUTO: ABNORMAL
LYMPHOCYTES # BLD AUTO: 1.99 10*3/MM3 (ref 0.9–4.8)
LYMPHOCYTES NFR BLD AUTO: 28.1 % (ref 19.6–45.3)
MCH RBC QN AUTO: 31 PG (ref 26.9–32)
MCHC RBC AUTO-ENTMCNC: 31.9 G/DL (ref 32.4–36.3)
MCV RBC AUTO: 97.1 FL (ref 80.5–98.2)
MONOCYTES # BLD AUTO: 0.66 10*3/MM3 (ref 0.2–1.2)
MONOCYTES NFR BLD AUTO: 9.3 % (ref 5–12)
NEUTROPHILS # BLD AUTO: 4.04 10*3/MM3 (ref 1.9–8.1)
NEUTROPHILS NFR BLD AUTO: 57.3 % (ref 42.7–76)
NITRITE UR QL STRIP: NEGATIVE
PH UR STRIP.AUTO: 6 [PH] (ref 5–8)
PLATELET # BLD AUTO: 295 10*3/MM3 (ref 140–500)
PMV BLD AUTO: 10.1 FL (ref 6–12)
POTASSIUM BLD-SCNC: 4 MMOL/L (ref 3.5–5.2)
PROT UR QL STRIP: ABNORMAL
PROTHROMBIN TIME: 13.1 SECONDS (ref 11.7–14.2)
RBC # BLD AUTO: 4.07 10*6/MM3 (ref 3.9–5.2)
RBC # UR: ABNORMAL /HPF
REF LAB TEST METHOD: ABNORMAL
SODIUM BLD-SCNC: 137 MMOL/L (ref 136–145)
SP GR UR STRIP: >=1.03 (ref 1–1.03)
SQUAMOUS #/AREA URNS HPF: ABNORMAL /HPF
UROBILINOGEN UR QL STRIP: ABNORMAL
WBC NRBC COR # BLD: 7.07 10*3/MM3 (ref 4.5–10.7)
WBC UR QL AUTO: ABNORMAL /HPF

## 2018-06-08 PROCEDURE — 36415 COLL VENOUS BLD VENIPUNCTURE: CPT

## 2018-06-08 PROCEDURE — 93010 ELECTROCARDIOGRAM REPORT: CPT | Performed by: INTERNAL MEDICINE

## 2018-06-08 PROCEDURE — 80048 BASIC METABOLIC PNL TOTAL CA: CPT | Performed by: NEUROLOGICAL SURGERY

## 2018-06-08 PROCEDURE — 85610 PROTHROMBIN TIME: CPT | Performed by: NEUROLOGICAL SURGERY

## 2018-06-08 PROCEDURE — 85025 COMPLETE CBC W/AUTO DIFF WBC: CPT | Performed by: NEUROLOGICAL SURGERY

## 2018-06-08 PROCEDURE — 81001 URINALYSIS AUTO W/SCOPE: CPT | Performed by: NEUROLOGICAL SURGERY

## 2018-06-08 PROCEDURE — 93005 ELECTROCARDIOGRAM TRACING: CPT

## 2018-06-08 PROCEDURE — 85730 THROMBOPLASTIN TIME PARTIAL: CPT | Performed by: NEUROLOGICAL SURGERY

## 2018-06-08 PROCEDURE — 71046 X-RAY EXAM CHEST 2 VIEWS: CPT

## 2018-06-08 RX ORDER — SULFAMETHOXAZOLE AND TRIMETHOPRIM 800; 160 MG/1; MG/1
1 TABLET ORAL 2 TIMES DAILY
COMMUNITY
End: 2018-07-06

## 2018-06-08 NOTE — DISCHARGE INSTRUCTIONS
Take the following medications the morning of surgery with a small sip of water:    ATIVAN, PROZAC, GABAPENTIN AND NEXIUM    ARRIVE AT 11:30    General Instructions:  • Do not eat solid food after midnight the night before surgery.  • You may drink clear liquids day of surgery but must stop at least one hour before your hospital arrival time.  • It is beneficial for you to have a clear drink that contains carbohydrates the day of surgery.  We suggest a 12 to 20 ounce bottle of Gatorade or Powerade for non-diabetic patients or a 12 to 20 ounce bottle of G2 or Powerade Zero for diabetic patients. (Pediatric patients, are not advised to drink a 12 to 20 ounce carbohydrate drink)    Clear liquids are liquids you can see through.  Nothing red in color.     Plain water                               Sports drinks  Sodas                                   Gelatin (Jell-O)  Fruit juices without pulp such as white grape juice and apple juice  Popsicles that contain no fruit or yogurt  Tea or coffee (no cream or milk added)  Gatorade / Powerade  G2 / Powerade Zero    • Infants may have breast milk up to four hours before surgery.  • Infants drinking formula may drink formula up to six hours before surgery.   • Patients who avoid smoking, chewing tobacco and alcohol for 4 weeks prior to surgery have a reduced risk of post-operative complications.  Quit smoking as many days before surgery as you can.  • Do not smoke, use chewing tobacco or drink alcohol the day of surgery.   • If applicable bring your C-PAP/ BI-PAP machine.  • Bring any papers given to you in the doctor’s office.  • Wear clean comfortable clothes and socks.  • Do not wear contact lenses or make-up.  Bring a case for your glasses.   • Bring crutches or walker if applicable.  • Remove all piercings.  Leave jewelry and any other valuables at home.  • Hair extensions with metal clips must be removed prior to surgery.  • The Pre-Admission Testing nurse will instruct  you to bring medications if unable to obtain an accurate list in Pre-Admission Testing.        If you were given a blood bank ID arm band remember to bring it with you the day of surgery.    Preventing a Surgical Site Infection:  • For 2 to 3 days before surgery, avoid shaving with a razor because the razor can irritate skin and make it easier to develop an infection.  • The night prior to surgery sleep in a clean bed with clean clothing.  Do not allow pets to sleep with you.  • Shower on the morning of surgery using a fresh bar of anti-bacterial soap (such as Dial) and clean washcloth.  Dry with a clean towel and dress in clean clothing.  • Ask your surgeon if you will be receiving antibiotics prior to surgery.  • Make sure you, your family, and all healthcare providers clean their hands with soap and water or an alcohol based hand  before caring for you or your wound.    Day of surgery:  Upon arrival, a Pre-op nurse and Anesthesiologist will review your health history, obtain vital signs, and answer questions you may have.  The only belongings needed at this time will be your home medications and if applicable your C-PAP/BI-PAP machine.  If you are staying overnight your family can leave the rest of your belongings in the car and bring them to your room later.  A Pre-op nurse will start an IV and you may receive medication in preparation for surgery, including something to help you relax.  Your family will be able to see you in the Pre-op area.  While you are in surgery your family should notify the waiting room  if they leave the waiting room area and provide a contact phone number.    Please be aware that surgery does come with discomfort.  We want to make every effort to control your discomfort so please discuss any uncontrolled symptoms with your nurse.   Your doctor will most likely have prescribed pain medications.      If you are going home after surgery you will receive individualized  written care instructions before being discharged.  A responsible adult must drive you to and from the hospital on the day of your surgery and stay with you for 24 hours.    If you are staying overnight following surgery, you will be transported to your hospital room following the recovery period.  [facility] has all private rooms.    If you have any questions please call Pre-Admission Testing at [phone].  Deductibles and co-payments are collected on the day of service. Please be prepared to pay the required co-pay, deductible or deposit on the day of service as defined by your plan.

## 2018-06-11 DIAGNOSIS — R93.89 ABNORMAL CHEST X-RAY: Primary | ICD-10-CM

## 2018-06-14 ENCOUNTER — HOSPITAL ENCOUNTER (OUTPATIENT)
Dept: CT IMAGING | Facility: HOSPITAL | Age: 73
Discharge: HOME | End: 2018-06-14
Attending: NEUROLOGICAL SURGERY | Admitting: NEUROLOGICAL SURGERY

## 2018-06-14 DIAGNOSIS — R93.89 ABNORMAL CHEST X-RAY: ICD-10-CM

## 2018-06-14 PROCEDURE — 71250 CT THORAX DX C-: CPT

## 2018-06-15 ENCOUNTER — ANESTHESIA EVENT (OUTPATIENT)
Dept: PERIOP | Facility: HOSPITAL | Age: 73
End: 2018-06-15

## 2018-06-15 ENCOUNTER — APPOINTMENT (OUTPATIENT)
Dept: GENERAL RADIOLOGY | Facility: HOSPITAL | Age: 73
End: 2018-06-15

## 2018-06-15 ENCOUNTER — ANESTHESIA (OUTPATIENT)
Dept: PERIOP | Facility: HOSPITAL | Age: 73
End: 2018-06-15

## 2018-06-15 ENCOUNTER — HOSPITAL ENCOUNTER (OUTPATIENT)
Facility: HOSPITAL | Age: 73
Setting detail: HOSPITAL OUTPATIENT SURGERY
Discharge: HOME | End: 2018-06-15
Attending: NEUROLOGICAL SURGERY | Admitting: NEUROLOGICAL SURGERY

## 2018-06-15 VITALS
TEMPERATURE: 97.6 F | OXYGEN SATURATION: 95 % | HEART RATE: 68 BPM | HEIGHT: 63 IN | DIASTOLIC BLOOD PRESSURE: 88 MMHG | BODY MASS INDEX: 19.41 KG/M2 | WEIGHT: 109.57 LBS | RESPIRATION RATE: 16 BRPM | SYSTOLIC BLOOD PRESSURE: 148 MMHG

## 2018-06-15 PROCEDURE — 76000 FLUOROSCOPY <1 HR PHYS/QHP: CPT

## 2018-06-15 PROCEDURE — 25010000002 PHENYLEPHRINE PER 1 ML: Performed by: NURSE ANESTHETIST, CERTIFIED REGISTERED

## 2018-06-15 PROCEDURE — 25010000002 MIDAZOLAM PER 1 MG: Performed by: ANESTHESIOLOGY

## 2018-06-15 PROCEDURE — 25010000002 NALOXONE PER 1 MG: Performed by: NURSE ANESTHETIST, CERTIFIED REGISTERED

## 2018-06-15 PROCEDURE — 25010000002 FENTANYL CITRATE (PF) 100 MCG/2ML SOLUTION: Performed by: NURSE ANESTHETIST, CERTIFIED REGISTERED

## 2018-06-15 PROCEDURE — 72100 X-RAY EXAM L-S SPINE 2/3 VWS: CPT

## 2018-06-15 PROCEDURE — 25010000002 PROPOFOL 10 MG/ML EMULSION: Performed by: NURSE ANESTHETIST, CERTIFIED REGISTERED

## 2018-06-15 PROCEDURE — 25010000002 ONDANSETRON PER 1 MG: Performed by: NURSE ANESTHETIST, CERTIFIED REGISTERED

## 2018-06-15 PROCEDURE — 25010000002 SUCCINYLCHOLINE PER 20 MG: Performed by: NURSE ANESTHETIST, CERTIFIED REGISTERED

## 2018-06-15 PROCEDURE — 25010000002 KETOROLAC TROMETHAMINE PER 15 MG: Performed by: NURSE ANESTHETIST, CERTIFIED REGISTERED

## 2018-06-15 PROCEDURE — 63047 LAM FACETEC & FORAMOT LUMBAR: CPT | Performed by: NEUROLOGICAL SURGERY

## 2018-06-15 PROCEDURE — 25010000003 CEFAZOLIN IN DEXTROSE 2-4 GM/100ML-% SOLUTION: Performed by: NEUROLOGICAL SURGERY

## 2018-06-15 PROCEDURE — 25010000002 DEXAMETHASONE PER 1 MG: Performed by: NURSE ANESTHETIST, CERTIFIED REGISTERED

## 2018-06-15 PROCEDURE — 25010000002 METHYLPREDNISOLONE PER 80 MG: Performed by: NEUROLOGICAL SURGERY

## 2018-06-15 RX ORDER — MIDAZOLAM HYDROCHLORIDE 1 MG/ML
2 INJECTION INTRAMUSCULAR; INTRAVENOUS
Status: DISCONTINUED | OUTPATIENT
Start: 2018-06-15 | End: 2018-06-15 | Stop reason: HOSPADM

## 2018-06-15 RX ORDER — ONDANSETRON 2 MG/ML
4 INJECTION INTRAMUSCULAR; INTRAVENOUS ONCE AS NEEDED
Status: DISCONTINUED | OUTPATIENT
Start: 2018-06-15 | End: 2018-06-15 | Stop reason: HOSPADM

## 2018-06-15 RX ORDER — LABETALOL HYDROCHLORIDE 5 MG/ML
5 INJECTION, SOLUTION INTRAVENOUS
Status: DISCONTINUED | OUTPATIENT
Start: 2018-06-15 | End: 2018-06-15 | Stop reason: HOSPADM

## 2018-06-15 RX ORDER — SCOLOPAMINE TRANSDERMAL SYSTEM 1 MG/1
1 PATCH, EXTENDED RELEASE TRANSDERMAL ONCE
Status: DISCONTINUED | OUTPATIENT
Start: 2018-06-15 | End: 2018-06-15 | Stop reason: HOSPADM

## 2018-06-15 RX ORDER — OXYCODONE AND ACETAMINOPHEN 7.5; 325 MG/1; MG/1
1 TABLET ORAL ONCE AS NEEDED
Status: DISCONTINUED | OUTPATIENT
Start: 2018-06-15 | End: 2018-06-15 | Stop reason: HOSPADM

## 2018-06-15 RX ORDER — SUCCINYLCHOLINE CHLORIDE 20 MG/ML
INJECTION INTRAMUSCULAR; INTRAVENOUS AS NEEDED
Status: DISCONTINUED | OUTPATIENT
Start: 2018-06-15 | End: 2018-06-15 | Stop reason: SURG

## 2018-06-15 RX ORDER — PROMETHAZINE HYDROCHLORIDE 25 MG/1
12.5 TABLET ORAL ONCE AS NEEDED
Status: DISCONTINUED | OUTPATIENT
Start: 2018-06-15 | End: 2018-06-15 | Stop reason: HOSPADM

## 2018-06-15 RX ORDER — PROMETHAZINE HYDROCHLORIDE 25 MG/1
25 TABLET ORAL ONCE AS NEEDED
Status: DISCONTINUED | OUTPATIENT
Start: 2018-06-15 | End: 2018-06-15 | Stop reason: HOSPADM

## 2018-06-15 RX ORDER — MEPERIDINE HYDROCHLORIDE 25 MG/ML
12.5 INJECTION INTRAMUSCULAR; INTRAVENOUS; SUBCUTANEOUS
Status: DISCONTINUED | OUTPATIENT
Start: 2018-06-15 | End: 2018-06-15 | Stop reason: HOSPADM

## 2018-06-15 RX ORDER — FAMOTIDINE 10 MG/ML
20 INJECTION, SOLUTION INTRAVENOUS ONCE
Status: COMPLETED | OUTPATIENT
Start: 2018-06-15 | End: 2018-06-15

## 2018-06-15 RX ORDER — FENTANYL CITRATE 50 UG/ML
50 INJECTION, SOLUTION INTRAMUSCULAR; INTRAVENOUS
Status: DISCONTINUED | OUTPATIENT
Start: 2018-06-15 | End: 2018-06-15 | Stop reason: HOSPADM

## 2018-06-15 RX ORDER — PROMETHAZINE HYDROCHLORIDE 25 MG/1
25 SUPPOSITORY RECTAL ONCE AS NEEDED
Status: DISCONTINUED | OUTPATIENT
Start: 2018-06-15 | End: 2018-06-15 | Stop reason: HOSPADM

## 2018-06-15 RX ORDER — DIPHENHYDRAMINE HYDROCHLORIDE 50 MG/ML
12.5 INJECTION INTRAMUSCULAR; INTRAVENOUS
Status: DISCONTINUED | OUTPATIENT
Start: 2018-06-15 | End: 2018-06-15 | Stop reason: HOSPADM

## 2018-06-15 RX ORDER — NALOXONE HCL 0.4 MG/ML
0.2 VIAL (ML) INJECTION AS NEEDED
Status: DISCONTINUED | OUTPATIENT
Start: 2018-06-15 | End: 2018-06-15 | Stop reason: HOSPADM

## 2018-06-15 RX ORDER — FENTANYL CITRATE 50 UG/ML
INJECTION, SOLUTION INTRAMUSCULAR; INTRAVENOUS AS NEEDED
Status: DISCONTINUED | OUTPATIENT
Start: 2018-06-15 | End: 2018-06-15 | Stop reason: SURG

## 2018-06-15 RX ORDER — ALBUTEROL SULFATE 2.5 MG/3ML
2.5 SOLUTION RESPIRATORY (INHALATION) ONCE AS NEEDED
Status: DISCONTINUED | OUTPATIENT
Start: 2018-06-15 | End: 2018-06-15 | Stop reason: HOSPADM

## 2018-06-15 RX ORDER — BUPIVACAINE HYDROCHLORIDE AND EPINEPHRINE 2.5; 5 MG/ML; UG/ML
INJECTION, SOLUTION INFILTRATION; PERINEURAL AS NEEDED
Status: DISCONTINUED | OUTPATIENT
Start: 2018-06-15 | End: 2018-06-15 | Stop reason: HOSPADM

## 2018-06-15 RX ORDER — ROCURONIUM BROMIDE 10 MG/ML
INJECTION, SOLUTION INTRAVENOUS AS NEEDED
Status: DISCONTINUED | OUTPATIENT
Start: 2018-06-15 | End: 2018-06-15 | Stop reason: SURG

## 2018-06-15 RX ORDER — DEXAMETHASONE SODIUM PHOSPHATE 10 MG/ML
INJECTION INTRAMUSCULAR; INTRAVENOUS AS NEEDED
Status: DISCONTINUED | OUTPATIENT
Start: 2018-06-15 | End: 2018-06-15 | Stop reason: SURG

## 2018-06-15 RX ORDER — MIDAZOLAM HYDROCHLORIDE 1 MG/ML
1 INJECTION INTRAMUSCULAR; INTRAVENOUS
Status: DISCONTINUED | OUTPATIENT
Start: 2018-06-15 | End: 2018-06-15 | Stop reason: HOSPADM

## 2018-06-15 RX ORDER — PROMETHAZINE HYDROCHLORIDE 25 MG/ML
12.5 INJECTION, SOLUTION INTRAMUSCULAR; INTRAVENOUS ONCE AS NEEDED
Status: DISCONTINUED | OUTPATIENT
Start: 2018-06-15 | End: 2018-06-15 | Stop reason: HOSPADM

## 2018-06-15 RX ORDER — HYDROCODONE BITARTRATE AND ACETAMINOPHEN 7.5; 325 MG/1; MG/1
1 TABLET ORAL ONCE AS NEEDED
Status: COMPLETED | OUTPATIENT
Start: 2018-06-15 | End: 2018-06-15

## 2018-06-15 RX ORDER — KETOROLAC TROMETHAMINE 30 MG/ML
INJECTION, SOLUTION INTRAMUSCULAR; INTRAVENOUS AS NEEDED
Status: DISCONTINUED | OUTPATIENT
Start: 2018-06-15 | End: 2018-06-15 | Stop reason: SURG

## 2018-06-15 RX ORDER — PROPOFOL 10 MG/ML
VIAL (ML) INTRAVENOUS AS NEEDED
Status: DISCONTINUED | OUTPATIENT
Start: 2018-06-15 | End: 2018-06-15 | Stop reason: SURG

## 2018-06-15 RX ORDER — ONDANSETRON 2 MG/ML
INJECTION INTRAMUSCULAR; INTRAVENOUS AS NEEDED
Status: DISCONTINUED | OUTPATIENT
Start: 2018-06-15 | End: 2018-06-15 | Stop reason: SURG

## 2018-06-15 RX ORDER — FLUMAZENIL 0.1 MG/ML
0.2 INJECTION INTRAVENOUS AS NEEDED
Status: DISCONTINUED | OUTPATIENT
Start: 2018-06-15 | End: 2018-06-15 | Stop reason: HOSPADM

## 2018-06-15 RX ORDER — GLYCOPYRROLATE 0.2 MG/ML
INJECTION INTRAMUSCULAR; INTRAVENOUS AS NEEDED
Status: DISCONTINUED | OUTPATIENT
Start: 2018-06-15 | End: 2018-06-15 | Stop reason: SURG

## 2018-06-15 RX ORDER — CYCLOBENZAPRINE HCL 10 MG
10 TABLET ORAL 3 TIMES DAILY PRN
Qty: 25 TABLET | Refills: 0 | Status: SHIPPED | OUTPATIENT
Start: 2018-06-15 | End: 2018-07-31 | Stop reason: HOSPADM

## 2018-06-15 RX ORDER — NALOXONE HYDROCHLORIDE 0.4 MG/ML
INJECTION, SOLUTION INTRAMUSCULAR; INTRAVENOUS; SUBCUTANEOUS AS NEEDED
Status: DISCONTINUED | OUTPATIENT
Start: 2018-06-15 | End: 2018-06-15 | Stop reason: SURG

## 2018-06-15 RX ORDER — HYDROCODONE BITARTRATE AND ACETAMINOPHEN 5; 325 MG/1; MG/1
1 TABLET ORAL EVERY 6 HOURS PRN
Qty: 25 TABLET | Refills: 0 | Status: SHIPPED | OUTPATIENT
Start: 2018-06-15 | End: 2018-07-06 | Stop reason: HOSPADM

## 2018-06-15 RX ORDER — SODIUM CHLORIDE 0.9 % (FLUSH) 0.9 %
1-10 SYRINGE (ML) INJECTION AS NEEDED
Status: DISCONTINUED | OUTPATIENT
Start: 2018-06-15 | End: 2018-06-15 | Stop reason: HOSPADM

## 2018-06-15 RX ORDER — CEFAZOLIN SODIUM 2 G/100ML
2 INJECTION, SOLUTION INTRAVENOUS ONCE
Status: COMPLETED | OUTPATIENT
Start: 2018-06-15 | End: 2018-06-15

## 2018-06-15 RX ORDER — LIDOCAINE HYDROCHLORIDE 20 MG/ML
INJECTION, SOLUTION INFILTRATION; PERINEURAL AS NEEDED
Status: DISCONTINUED | OUTPATIENT
Start: 2018-06-15 | End: 2018-06-15 | Stop reason: SURG

## 2018-06-15 RX ORDER — EPHEDRINE SULFATE 50 MG/ML
5 INJECTION, SOLUTION INTRAVENOUS ONCE AS NEEDED
Status: DISCONTINUED | OUTPATIENT
Start: 2018-06-15 | End: 2018-06-15 | Stop reason: HOSPADM

## 2018-06-15 RX ORDER — METHYLPREDNISOLONE ACETATE 80 MG/ML
INJECTION, SUSPENSION INTRA-ARTICULAR; INTRALESIONAL; INTRAMUSCULAR; SOFT TISSUE AS NEEDED
Status: DISCONTINUED | OUTPATIENT
Start: 2018-06-15 | End: 2018-06-15 | Stop reason: HOSPADM

## 2018-06-15 RX ORDER — HYDROMORPHONE HYDROCHLORIDE 1 MG/ML
0.5 INJECTION, SOLUTION INTRAMUSCULAR; INTRAVENOUS; SUBCUTANEOUS
Status: DISCONTINUED | OUTPATIENT
Start: 2018-06-15 | End: 2018-06-15 | Stop reason: HOSPADM

## 2018-06-15 RX ORDER — LIDOCAINE HYDROCHLORIDE 10 MG/ML
0.5 INJECTION, SOLUTION EPIDURAL; INFILTRATION; INTRACAUDAL; PERINEURAL ONCE AS NEEDED
Status: DISCONTINUED | OUTPATIENT
Start: 2018-06-15 | End: 2018-06-15 | Stop reason: HOSPADM

## 2018-06-15 RX ORDER — SODIUM CHLORIDE, SODIUM LACTATE, POTASSIUM CHLORIDE, CALCIUM CHLORIDE 600; 310; 30; 20 MG/100ML; MG/100ML; MG/100ML; MG/100ML
9 INJECTION, SOLUTION INTRAVENOUS CONTINUOUS
Status: DISCONTINUED | OUTPATIENT
Start: 2018-06-15 | End: 2018-06-15 | Stop reason: HOSPADM

## 2018-06-15 RX ADMIN — ONDANSETRON 4 MG: 2 INJECTION INTRAMUSCULAR; INTRAVENOUS at 16:21

## 2018-06-15 RX ADMIN — FAMOTIDINE 20 MG: 10 INJECTION, SOLUTION INTRAVENOUS at 15:23

## 2018-06-15 RX ADMIN — NALOXONE HYDROCHLORIDE 0.2 MG: 0.4 INJECTION, SOLUTION INTRAMUSCULAR; INTRAVENOUS; SUBCUTANEOUS at 16:34

## 2018-06-15 RX ADMIN — GLYCOPYRROLATE 0.2 MG: 0.2 INJECTION INTRAMUSCULAR; INTRAVENOUS at 15:41

## 2018-06-15 RX ADMIN — KETOROLAC TROMETHAMINE 15 MG: 30 INJECTION, SOLUTION INTRAMUSCULAR; INTRAVENOUS at 16:22

## 2018-06-15 RX ADMIN — FENTANYL CITRATE 50 MCG: 50 INJECTION INTRAMUSCULAR; INTRAVENOUS at 15:41

## 2018-06-15 RX ADMIN — MIDAZOLAM 1 MG: 1 INJECTION INTRAMUSCULAR; INTRAVENOUS at 15:22

## 2018-06-15 RX ADMIN — PHENYLEPHRINE HYDROCHLORIDE 100 MCG: 10 INJECTION INTRAVENOUS at 15:56

## 2018-06-15 RX ADMIN — CEFAZOLIN SODIUM 2 G: 2 INJECTION, SOLUTION INTRAVENOUS at 15:42

## 2018-06-15 RX ADMIN — SODIUM CHLORIDE, POTASSIUM CHLORIDE, SODIUM LACTATE AND CALCIUM CHLORIDE 9 ML/HR: 600; 310; 30; 20 INJECTION, SOLUTION INTRAVENOUS at 15:23

## 2018-06-15 RX ADMIN — SCOPALAMINE 1 PATCH: 1 PATCH, EXTENDED RELEASE TRANSDERMAL at 15:23

## 2018-06-15 RX ADMIN — LIDOCAINE HYDROCHLORIDE 80 MG: 20 INJECTION, SOLUTION INFILTRATION; PERINEURAL at 15:42

## 2018-06-15 RX ADMIN — HYDROCODONE BITARTRATE AND ACETAMINOPHEN 1 TABLET: 7.5; 325 TABLET ORAL at 17:01

## 2018-06-15 RX ADMIN — FENTANYL CITRATE 50 MCG: 50 INJECTION INTRAMUSCULAR; INTRAVENOUS at 16:10

## 2018-06-15 RX ADMIN — SUCCINYLCHOLINE CHLORIDE 80 MG: 20 INJECTION, SOLUTION INTRAMUSCULAR; INTRAVENOUS; PARENTERAL at 15:42

## 2018-06-15 RX ADMIN — ROCURONIUM BROMIDE 5 MG: 10 INJECTION INTRAVENOUS at 15:42

## 2018-06-15 RX ADMIN — ROCURONIUM BROMIDE 25 MG: 10 INJECTION INTRAVENOUS at 15:48

## 2018-06-15 RX ADMIN — PROPOFOL 120 MG: 10 INJECTION, EMULSION INTRAVENOUS at 15:42

## 2018-06-15 RX ADMIN — PHENYLEPHRINE HYDROCHLORIDE 100 MCG: 10 INJECTION INTRAVENOUS at 16:10

## 2018-06-15 RX ADMIN — SUGAMMADEX 200 MG: 100 INJECTION, SOLUTION INTRAVENOUS at 16:26

## 2018-06-15 RX ADMIN — DEXAMETHASONE SODIUM PHOSPHATE 4 MG: 10 INJECTION INTRAMUSCULAR; INTRAVENOUS at 16:20

## 2018-06-15 NOTE — ANESTHESIA PROCEDURE NOTES
Airway  Urgency: elective    Airway not difficult    General Information and Staff    Patient location during procedure: OR  Anesthesiologist: JOE BRYSON  CRNA: YOLY GARAY    Indications and Patient Condition  Indications for airway management: airway protection    Preoxygenated: yes  MILS maintained throughout  Mask difficulty assessment: 1 - vent by mask    Final Airway Details  Final airway type: endotracheal airway      Successful airway: ETT  Cuffed: yes   Successful intubation technique: direct laryngoscopy  Facilitating devices/methods: intubating stylet  Endotracheal tube insertion site: oral  Blade: Chauncey  Blade size: #3  ETT size: 7.0 mm  Cormack-Lehane Classification: grade I - full view of glottis  Placement verified by: chest auscultation and capnometry   Inital cuff pressure (cm H2O): 22  Cuff volume (mL): 7  Measured from: lips  ETT to lips (cm): 21  Number of attempts at approach: 1

## 2018-06-15 NOTE — ANESTHESIA POSTPROCEDURE EVALUATION
Patient: Giselle Bundy    Procedure Summary     Date:  06/15/18 Room / Location:  [facility] OR [address] [place] [address] MAIN OR    Anesthesia Start:  1541 Anesthesia Stop:  1644    Procedure:  LUMBAR DISCECTOMY POSTERIOR WITH METRIX, outpatient left L4/5 Metrx decompression (Left Spine Lumbar) Diagnosis:       Spinal stenosis of lumbar region with neurogenic claudication      Other forms of scoliosis, lumbar region      (Spinal stenosis of lumbar region with neurogenic claudication [M48.062])      (Other forms of scoliosis, lumbar region [M41.86])    Surgeon:  Irwin Craft MD Provider:  Coy Silvestre MD    Anesthesia Type:  general ASA Status:  2          Anesthesia Type: general  Last vitals  BP   148/88 (06/15/18 1900)   Temp   36.4 °C (97.6 °F) (06/15/18 1647)   Pulse   68 (06/15/18 1900)   Resp   16 (06/15/18 1900)     SpO2   95 % (06/15/18 1900)     Post Anesthesia Care and Evaluation    Patient participation: complete - patient participated  Level of consciousness: awake  Pain management: adequate  Airway patency: patent  Anesthetic complications: No anesthetic complications    Cardiovascular status: acceptable  Respiratory status: acceptable  Hydration status: acceptable

## 2018-06-15 NOTE — BRIEF OP NOTE
LUMBAR DISCECTOMY POSTERIOR WITH METRIX  Progress Note    Giselle Bundy  6/15/2018    Pre-op Diagnosis:   Spinal stenosis of lumbar region with neurogenic claudication [M48.062] Left L4/5  Other forms of scoliosis, lumbar region [M41.86]       Post-Op Diagnosis Codes:     * Spinal stenosis of lumbar region with neurogenic claudication [M48.062] same as above     * Other forms of scoliosis, lumbar region [M41.86]    Procedure/CPT® Codes:      Procedure(s):  LUMBAR DISCECTOMY POSTERIOR WITH METRIX, outpatient left L4/5 Metrx decompression    Surgeon(s):  Irwin Craft MD    Anesthesia: General    Staff:   Circulator: Helen Oh RN  Scrub Person: Odette Peña  Assistant: Jackie Shaw CSA    Estimated Blood Loss: 5 mL    Urine Voided: none    Specimens:              none      Drains:   none    Findings: severe stenosis    Complications: none      Irwin Craft MD     Date: 6/15/2018  Time: 4:25 PM

## 2018-06-15 NOTE — DISCHARGE INSTRUCTIONS
Your last dose of pain medicine was given to you at 5:01 pm.    May remove dressing in 48 hours. No need to replace. Rx for pain medication left for patient. Rx for muscle relaxant sent to pharmacy. Go over d/c instructions below. Has f/u visit in about two weeks.     [facility]   [address], [address] [place] [id number]   [phone]     Lumbar Laminectomy, Discectomy or Decompression Post-Operative Instructions     1.   You should have a post-operative visit scheduled with the Physician Assistant/Nurse Practitioner for approximately 2 to 2 1/2 weeks from your date of surgery. If you do not have one, call the office when you return home to schedule a visit. You should also be off work at least until your first visit.     2.   Do not lift anything over five (5) pounds. No bending, twisting, or squatting should be done until the first visit. However, walking is allowed and encouraged. Walking should be done on a flat surface. The speed and distance should be chosen according to your comfort level and stamina. In general, short frequent walks are preferred. Climbing stairs is allowed but should be minimized. In general, activity restrictions will be lessened following the first visit.     3.   Sitting, while allowed, should be kept to a minimum until the first visit because it may increase your discomfort. No more than 15 to 20 minutes three times a day should be done - enough time to eat your meals and use the bathroom. Otherwise, if you are not walking or standing, you should be lying down on your back or side. A reclining chair is acceptable.     4.   Please do not drive until the first visit, although, you may be driven.     5.   Refrain from any sexual activity until after your first visit with the physician assistant.     6.   Take off your dressings and leave them off after the first day home. You may get the incisions wet during showering and pat them dry, but do not soak the  incisions or rub them vigorously with a towel. No tub baths or hot tubs are allowed. The incision should be cleaned three times a daily with hydrogen peroxide unless otherwise directed by the nurse or physician.     7.   A prescription for pain medication will be provided at discharge. This medication is primarily for any pain related to the incision and should be used only on an as-needed basis. Sometimes, antibiotics and medications for spasm are also given. Take medications only as directed by the doctor. If a refill of your pain medication is required, due to changes in the Federal law, in order to have this medication refilled you must contact the office four days prior to the due date to make arrangements to pick-up the prescriptionin or office. The prescription refill cannot be called in to the pharmacy. Your prescription will be ready fro pick-up the day the refill is due.     8.   If there are any problems, such as excessive back or leg pain, persistent temperature of 100 degrees or greater despite Tylenol, chills excessive bloody discharge from the wound, redness and hot skin around the incision, clear or yellowish discharge from the wound, or severe headaches, particularly when sitting or standing, please call the office. A small amount of bleeding from the incision during the first few days is not unusual.     9.   We look forward to seeing you again for follow-up. Do not hesitate to call if any questions or concerns arise regarding your surgery. We would rather you communicate with us regarding such issues early.      Scopolamine Patch  This patch has been applied to the skin behind one of your ears.  It may stay in place up to 24 hours. You may remove it at any time after your surgery; however, it should be removed after you are up and walking around the next day.  This medicine reduces stomach upset. Side effects may include: dry mouth, dizziness, sleepiness, constipation, or upset stomach.  An  allergy would show up as: a rash, itching, wheezing or shortness of breath.  Follow these instructions:  1. Do not drink alcohol, drive or operate machinery while taking this medicine.  2. Wear only 1 patch at a time. You can leave the patch on for up to 24 hours.  3. When you remove the patch, fold it in half with the sticky sides together and throw it away. Wash your hands and the area under the patch.  4. Do not touch your eye with your hand if it has touched the patch.  5. Wash your hands well before and after touching the patch.  6. Sit or stand slowly to avoid dizziness.  Call your doctor if you have:  1. Any sign of allergy  2. No relief  3. Trouble passing urine  4. Any new or severe symptoms      SEDATION DISCHARGE INSTRUCTIONS.    IMPORTANT: The following information will help you return to your best level of health.  GENERAL ANESTHESIA.  You have had general anesthesia. You were given a medicine to help you go to sleep and not feel pain.    Follow these instructions:   Go right home. Rest quietly at home today, then you can be up and about.   Do not drink alcohol, drive or operate machinery for 24 hours.   Do not make any important decisions or sign any legal papers in the next 24 hours.   Have a RESPONSIBLE PERSON stay with you the rest of today and overnight for your protection and safety.   Start your diet with fluids and light foods (jello, soup, juice, toast). Then eat a normal diet if not nauseated.    Call your doctor if you have:   any blue or gray skin color.   repeated vomiting.   trouble breathing.   any new problems or concerns.

## 2018-06-15 NOTE — ANESTHESIA PREPROCEDURE EVALUATION
Anesthesia Evaluation     Patient summary reviewed and Nursing notes reviewed   history of anesthetic complications: PONV  NPO Solid Status: > 8 hours  NPO Liquid Status: > 4 hours           Airway   Mallampati: II  TM distance: >3 FB  Neck ROM: full  No difficulty expected  Dental    (+) implants    Pulmonary - normal exam   (+) sleep apnea (no treatment, likely),   Cardiovascular - normal exam    ECG reviewed    (+) hyperlipidemia,       Neuro/Psych  (+) numbness, psychiatric history Anxiety and Depression,     GI/Hepatic/Renal/Endo    (+)  GERD,      Musculoskeletal     (+) back pain,   Abdominal    Substance History      OB/GYN          Other      history of cancer                    Anesthesia Plan    ASA 2     general   (Scop patch)  intravenous induction   Anesthetic plan and risks discussed with patient.

## 2018-06-15 NOTE — OP NOTE
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 She does have a history of some chronic back pain from her scoliosis, but more recently she has developed left buttock and leg pain from superimposed spinal stenosis at L4-L5. We had tried conservative treatments including blocks, but they failed to help sufficiently, as well as gabapentin. She was brought to the operating room for a unilateral 1-level decompression on the left without fusion.        Informed consent: She understood that the goal of surgery is relief of radiating leg pain with improvement of numbness, tingling, walking, and quality of life.  This will not help or hinder low back pain.  The risks include, but are not limited to, infection, hemorrhage on transfusion or reoperation, CSF leak requiring reoperation, incomplete relief of symptoms, potential need for additional surgeries in the future including a fusion, stroke, paralysis, coma, and death.  The patient agrees to proceed.    Details of the operation:The patient was taken to the operating room and remained in her gurney in a supine position. After induction and endotracheal intubation, she was got 2 g of Kefzol IV as per the SCIP protocol. SCDs were placed. Venous access was secured. She was rolled over in the prone position on a radiolucent Chevy spinal table. All pressure points were  padded, including the brachial plexus. We brought in the C-arm and marked out the incision at the left L4-L5 level about a centimeter out laterally. The lumbar region was then prepped and draped in the usual sterile fashion. We did a surgical timeout. I injected 10 mL of 0.25% Marcaine with epinephrine into the paraspinous musculature on the left at L4-L5. A paramedian incision was made at the L4-L5 level just to the left with a #10 skin knife. Hemostasis was obtained with monopolar cautery. Using lateral C-arm fluoroscopy, I dilated up to 16 mm of tubular retraction and then used a 3 cm x 16 mm tubular retractor docked on the left at L4-L5. We affixed it to the table with a flexible snake arm. Permanent records were obtained in the PA and lateral view marked left L4-L5. The C-arm was removed. The microscope was draped and brought in the field. Its use was essential to the performance of microneurosurgical technique. Using the 3 mm matchstick on the electric Selectica drill, I did a generous left L4-L5 hemilaminectomy, partial medial facetectomy and foraminotomy. The yellow ligament was divided with a #15 blade, and the remainder of the bony and ligamentous removal was done with 2 and 3 mm angled Cloward punches. I identified the left L5 nerve root and retracted it medially. Underneath was just a bulging disk, not of any significance, and most of the compression was bony hypertrophy and stenosis. This was removed with the 3 and 2 mm punch as well as the drill. Following this the L5 root was completely decompressed. Hemostasis was complete. No CSF leakage was seen; 80 mg of Depo-Medrol was left in the epidural space. The tubular retractor was removed. The subcutaneous layer was closed with 3-0 interrupted Vicryl suture. The skin was closed with a running 4-0 Vicryl subcuticular. Steri-Strips and a clean, dry dressing were placed. She was rolled over in the supine position and extubated, taken to the recovery room in  satisfactory condition. This is an outpatient procedure, and she should be going home.

## 2018-06-22 ENCOUNTER — TELEPHONE (OUTPATIENT)
Dept: NEUROSURGERY | Facility: CLINIC | Age: 73
End: 2018-06-22

## 2018-06-22 NOTE — TELEPHONE ENCOUNTER
Pt called today b/c she has be having increased tingling in her lt leg since 6/20/18. She says that she had been pretty much pain and tingling free post op until she was laying at an odd angle during a long phone call with a friend that was needing some help with something. Since then her tingling has increased. She doesn't think it is pain as much as uncomfortable/tingling. She is concerned that she may have caused some damage.  She took Gabapentin for a day but now wants to be sure it is ok to do that.She can be reached at [phone].

## 2018-06-22 NOTE — TELEPHONE ENCOUNTER
Tell her the tingling not unusual postop. She can take gabapentin as prescribed. We'll see her at her postop visit.

## 2018-07-06 ENCOUNTER — OFFICE VISIT (OUTPATIENT)
Dept: NEUROSURGERY | Facility: CLINIC | Age: 73
End: 2018-07-06

## 2018-07-06 VITALS
HEART RATE: 86 BPM | WEIGHT: 109 LBS | SYSTOLIC BLOOD PRESSURE: 123 MMHG | DIASTOLIC BLOOD PRESSURE: 63 MMHG | BODY MASS INDEX: 19.31 KG/M2 | HEIGHT: 63 IN

## 2018-07-06 DIAGNOSIS — Z09 SURGERY FOLLOW-UP EXAMINATION: Primary | ICD-10-CM

## 2018-07-06 PROBLEM — M48.062 SPINAL STENOSIS OF LUMBAR REGION WITH NEUROGENIC CLAUDICATION: Status: RESOLVED | Noted: 2018-05-31 | Resolved: 2018-07-06

## 2018-07-06 PROCEDURE — 99024 POSTOP FOLLOW-UP VISIT: CPT | Performed by: PHYSICIAN ASSISTANT

## 2018-07-06 RX ORDER — ERYTHROMYCIN 5 MG/G
OINTMENT OPHTHALMIC
Refills: 1 | COMMUNITY
Start: 2018-05-30 | End: 2018-09-19

## 2018-07-06 RX ORDER — PREDNISOLONE ACETATE 10 MG/ML
SUSPENSION/ DROPS OPHTHALMIC
Refills: 0 | COMMUNITY
Start: 2018-05-30 | End: 2018-07-31 | Stop reason: HOSPADM

## 2018-07-06 NOTE — PROGRESS NOTES
Subjective   Patient ID: Giselle Bundy is a 72 y.o. female is here today for follow-up.  She is 2 week out from a left L4-5  metrix decompression by Dr. Craft 6/15/18.  She is doing well. She denies any incision problems.  Mrs. Bundy takes Gabapentin 300 mg TID for pain.     Back Pain   The patient is experiencing no pain.       The following portions of the patient's history were reviewed and updated as appropriate: allergies, current medications, past family history, past medical history, past social history, past surgical history and problem list.    Review of Systems   Musculoskeletal: Positive for back pain.   All other systems reviewed and are negative.      Objective   Physical Exam   Neurological:   Incision ok     Neurologic Exam    Assessment/Plan   Independent Review of Radiographic Studies:      Medical Decision Making:    Ms. Bundy doing well.  She is 2 weeks out from a left L4-L5 Metrix decompression.  She no longer has any nerve pain only very mild incisional discomfort.  Her wound has healed nicely without signs of infection.  She is off all pain medication.  We did discuss her restrictions.  She will begin physical therapy.  She will follow-up in 4 weeks.  Giselle was seen today for post-op and back pain.    Diagnoses and all orders for this visit:    Surgery follow-up examination  -     Ambulatory Referral to Physical Therapy Evaluate and treat (2-3 times per week for 4wks. )      Return in about 4 weeks (around 8/3/2018).

## 2018-07-18 ENCOUNTER — TREATMENT (OUTPATIENT)
Dept: PHYSICAL THERAPY | Facility: CLINIC | Age: 73
End: 2018-07-18

## 2018-07-18 DIAGNOSIS — Z98.890 S/P DISKECTOMY: Primary | ICD-10-CM

## 2018-07-18 PROCEDURE — 97530 THERAPEUTIC ACTIVITIES: CPT | Performed by: PHYSICAL THERAPIST

## 2018-07-18 PROCEDURE — G8979 MOBILITY GOAL STATUS: HCPCS | Performed by: PHYSICAL THERAPIST

## 2018-07-18 PROCEDURE — G8978 MOBILITY CURRENT STATUS: HCPCS | Performed by: PHYSICAL THERAPIST

## 2018-07-18 PROCEDURE — G0283 ELEC STIM OTHER THAN WOUND: HCPCS | Performed by: PHYSICAL THERAPIST

## 2018-07-18 PROCEDURE — 97161 PT EVAL LOW COMPLEX 20 MIN: CPT | Performed by: PHYSICAL THERAPIST

## 2018-07-18 PROCEDURE — 97110 THERAPEUTIC EXERCISES: CPT | Performed by: PHYSICAL THERAPIST

## 2018-07-18 NOTE — PATIENT INSTRUCTIONS
Patient was educated on findings of evaluation, purpose of treatment, and goals for therapy.  Treatment options discussed and questions answered.  Patient was educated on exercises/self treatment/pain relief techniques.  Please view [facility] Giselle Bundy for a complete list of HEP instructions.  Scar massage, precautions and restrictions reviewed.

## 2018-07-18 NOTE — PROGRESS NOTES
Physical Therapy Initial Evaluation and Plan of Care    TIME IN 12:55 TIME OUT 2:30    Patient: Giselle Bundy   : 1945  Diagnosis/ICD-10 Code:  S/P diskectomy [Z98.890]  Referring practitioner: Yazmin Nj PA-C  Date of Initial Visit: 2018  Today's Date: 2018  Patient seen for 1 sessions           Subjective Questionnaire: Oswestry: 31%      Subjective Evaluation    History of Present Illness  Date of surgery: 6/15/2018  Mechanism of injury: RIght hip bursitis for 3-4 years.  Has walked a lot, bursitis slowed her down.  Then started having hip pain in left, shot, then after xrays determined it was back trouble.  Went to Dr. RIOS And started on Gabapentin.  Sitting started to bother me.  Seen at [facility], chiropractor. Last year, pain progressed until prohibited activity.  Started with tingling down hip into leg. After surgery I had no pain when I woke up.  As started doing more, had pain and a little tingling down my leg.  Started back on Gabapentin again.    Subjective comment: (-) valsalva, (-) bowel/bladder, PMH:  right hip bursitis, neck pain   Patient Occupation: Retired CPA   Precautions and Work Restrictions: sitting restrictions, no lifting, log rollPain  Current pain ratin  At worst pain ratin  Location: left posterior hip and SI, mild tingling lateral leg  Quality: dull ache  Relieving factors: rest and heat (lying down)  Aggravating factors: stairs (sitting, standing)  Progression: improved    Social Support  Lives in: multiple-level home  Lives with: spouse    Treatments  Previous treatment: chiropractic  Current treatment: physical therapy  Patient Goals  Patient goals for therapy: increased strength and decreased pain  Patient goal: walking mile or two, back to gym, weight lifting, elliptical, back to riding horse           Objective       Palpation   Left   Tenderness of the erector spinae.     Neurological Testing     Sensation     Lumbar   Left   Intact: light  touch    Right   Intact: light touch    Reflexes   Left   Patellar (L4): normal (2+)  Achilles (S1): trace (1+)    Right   Patellar (L4): normal (2+)  Achilles (S1): normal (2+)    Strength/Myotome Testing     Left Hip   Planes of Motion   Flexion: 4+  Extension: 3  Abduction: 3  Adduction: 3    Right Hip   Planes of Motion   Flexion: 5  Extension: 3  Abduction: 3  Adduction: 3    Left Knee   Flexion: 4+  Extension: 4+    Right Knee   Flexion: 5  Extension: 5    Left Ankle/Foot   Dorsiflexion: 4+  Plantar flexion: 4+  Great toe extension: 5    Right Ankle/Foot   Dorsiflexion: 5  Plantar flexion: 5  Great toe extension: 5    Muscle Activation   Patient able to activate left transverse abdominals and right transverse abdominals.     Tests     Lumbar     Left   Negative passive SLR.     Right   Negative passive SLR.     Additional Tests Details  HS 90/90 right -5, left -20  Right hip ER 80 degrees, left 65 degrees.  Scar healing well.  Palpable scar tissue, small scab at distal incision.    Ambulation     Observational Gait   Gait: asymmetric   Decreased walking speed, stride length and left stance time.     Quality of Movement During Gait   Trunk    Trunk (Right): Positive lateral lean over stance limb.     Pelvis    Pelvis (Left): Positive Trendelenburg.     Additional Quality of Movement During Gait Details  Trendelenburg noted after 2.5 minutes of walking on the treadmill.         Assessment & Plan     Assessment  Impairments: abnormal gait, abnormal muscle firing, abnormal muscle tone, activity intolerance, impaired physical strength and lacks appropriate home exercise program  Assessment details: Patient is a 73 yo female s/p 1 month L4/5 lumbar diskectomy.  She presents with a well healing scar.  She is ambulating and transferring independently but needed reminders on precautions and restrictions.  She has weakness in the core and LE.  She has mild tingling but no significant numbness.  SLR is negative.  She is  only able to ambulate a few minutes before the left leg fatigues and she has a Trendelenburg gait pattern.  The patient is very active and would like to return not only to ADL's but a walking program, lifting weights, and riding her horses again.  Based on the above findings, the patient is a good candidate for therapy to regain full function.  Prognosis: good  Functional Limitations: carrying objects, lifting, walking, pulling, pushing, sitting and standing  Goals  Plan Goals: STG X4 weeks. Pt will:  1. Tolerate initial HEP  2. Perform advanced TrA retraining exercises with lumbar stability.  3. Increase left hip strength 1 grade.  4.  Core strength 3/5.    LTG X 8 weeks. Pt will:  1. Report pain of </= 1/10 with all ADLs  2. Demonstrate pain free funcitonal AROM  3. Oswestry </= 10%  4. 4/5  hip strength on the left grade.  5. Ambulate with non antalgic gait pattern.      Plan  Therapy options: will be seen for skilled physical therapy services  Planned modality interventions: cryotherapy and electrical stimulation/Russian stimulation  Planned therapy interventions: abdominal trunk stabilization, strengthening, stretching, body mechanics training, ADL retraining, home exercise program, gait training, functional ROM exercises, flexibility, soft tissue mobilization and neuromuscular re-education  Frequency: 3x week  Duration in weeks: 4  Treatment plan discussed with: patient        Manual Therapy:    5     mins  34269;  Therapeutic Exercise:    30     mins  78225;     Neuromuscular Lor:         mins  73214;    Therapeutic Activity:     10     mins  63710;     Gait Training:            mins  50501;     Ultrasound:           mins  03307;    Electrical Stimulation:    15     mins  67041 ( );  Dry Needling           mins self-pay    Timed Treatment:   45   mins   Total Treatment:     95   mins    PT SIGNATURE: Arlene Silverman, PT   DATE TREATMENT INITIATED: 7/18/2018    Initial Certification  Certification  Period: 10/16/2018  I certify that the therapy services are furnished while this patient is under my care.  The services outlined above are required by this patient, and will be reviewed every 90 days.     PHYSICIAN: Yazmin Nj PA-C      DATE:     Please sign and return via fax to [phone].. Thank you, [facility].

## 2018-07-20 ENCOUNTER — TREATMENT (OUTPATIENT)
Dept: PHYSICAL THERAPY | Facility: CLINIC | Age: 73
End: 2018-07-20

## 2018-07-20 DIAGNOSIS — Z98.890 STATUS POST LUMBAR DISCECTOMY: Primary | ICD-10-CM

## 2018-07-20 PROCEDURE — 97112 NEUROMUSCULAR REEDUCATION: CPT | Performed by: PHYSICAL THERAPIST

## 2018-07-20 PROCEDURE — 97110 THERAPEUTIC EXERCISES: CPT | Performed by: PHYSICAL THERAPIST

## 2018-07-20 NOTE — PROGRESS NOTES
Physical Therapy Daily Progress Note    Time In 1:30  Time Out 2:45    Visit # : 2  Giselle Bundy reports: forgot and sat several hours yesterday.  Stiff today. I was able to walk last night for a mile and it felt good on my back.    Subjective     Objective   See Exercise, Manual, and Modality Logs for complete treatment.   Significant antalgia with gait and Trendelenburg when fatigued but no pain.    Assessment/Plan  No difficulty with exercises, improved ROM with stretches.  Progress strengthening /stabilization /functional activity  Add bands to BKFO.         Manual Therapy:          mins  60982;  Therapeutic Exercise:    25     mins  38314;     Neuromuscular Lor:    20    mins  97951;    Therapeutic Activity:           mins  29325;     Gait Training:            mins  22323;     Ultrasound:           mins  01222;    Electrical Stimulation:    15     mins  60350 ( );  Dry Needling           mins self-pay    Timed Treatment:   45   mins   Total Treatment:     75   mins    Arlene Silverman, PT  Physical Therapist

## 2018-07-23 ENCOUNTER — TREATMENT (OUTPATIENT)
Dept: PHYSICAL THERAPY | Facility: CLINIC | Age: 73
End: 2018-07-23

## 2018-07-23 DIAGNOSIS — Z98.890 STATUS POST LUMBAR DISCECTOMY: Primary | ICD-10-CM

## 2018-07-23 PROCEDURE — 97112 NEUROMUSCULAR REEDUCATION: CPT | Performed by: PHYSICAL THERAPIST

## 2018-07-23 PROCEDURE — G0283 ELEC STIM OTHER THAN WOUND: HCPCS | Performed by: PHYSICAL THERAPIST

## 2018-07-23 PROCEDURE — 97110 THERAPEUTIC EXERCISES: CPT | Performed by: PHYSICAL THERAPIST

## 2018-07-23 NOTE — PROGRESS NOTES
Physical Therapy Daily Progress Note    Time In 3:00  Time Out 4:03    Visit # : 3  Giselle Bundy reports: sore in hip, near area that was hurt before surgery but more soreness.    Subjective     Objective   See Exercise, Manual, and Modality Logs for complete treatment.   Tender left piriformis  Audible loss of DF on treadmill on left ankle.    Assessment/Plan  Tenderness on left piriformis appears to be muscular as patient has recently increased activity, walking, and started therapy in the last week.  Will monitor but (-) SLR and no radiation.  Progress per Plan of Care  Add band to BKFO, table tops, prone alt leg  Bridges, clams       Manual Therapy:          mins  39671;  Therapeutic Exercise:    25     mins  94894;     Neuromuscular Lor:    20    mins  34431;    Therapeutic Activity:           mins  87533;     Gait Training:            mins  49934;     Ultrasound:           mins  51311;    Electrical Stimulation:    15     mins  79056 ( );  Dry Needling           mins self-pay    Timed Treatment:   45   mins   Total Treatment:     63   mins    Arlene Silverman, PT  Physical Therapist

## 2018-07-25 ENCOUNTER — TREATMENT (OUTPATIENT)
Dept: PHYSICAL THERAPY | Facility: CLINIC | Age: 73
End: 2018-07-25

## 2018-07-25 DIAGNOSIS — Z98.890 STATUS POST LUMBAR DISCECTOMY: Primary | ICD-10-CM

## 2018-07-25 PROCEDURE — G0283 ELEC STIM OTHER THAN WOUND: HCPCS | Performed by: PHYSICAL THERAPIST

## 2018-07-25 PROCEDURE — 97110 THERAPEUTIC EXERCISES: CPT | Performed by: PHYSICAL THERAPIST

## 2018-07-25 PROCEDURE — 97112 NEUROMUSCULAR REEDUCATION: CPT | Performed by: PHYSICAL THERAPIST

## 2018-07-25 NOTE — PROGRESS NOTES
Physical Therapy Daily Progress Note    Time In 10:35  Time Out 11:55    Visit # : 4  Giselle Bundy reports: right hip bursitis has been flaring up.    Subjective     Objective   See Exercise, Manual, and Modality Logs for complete treatment.   Patient education:  Caution with overdoing activity especially walking due to gait changes.  Avoid overstretching or stretching into pain.  Tender just superior to right greater trochanter.    Assessment/Plan  No pain with current exercises at hip.  Trendelenburg still noted when patient fatigues and feel this could be contributing to increased tenderness at right trochanteric bursa. Improved multifidus activation with transversus abdominis contraction.  Progress per Plan of Care  Prone alt leg  LPD  Monitor hip with exercises.         Manual Therapy:          mins  19729;  Therapeutic Exercise:    30     mins  92498;     Neuromuscular Lor:    25    mins  57515;    Therapeutic Activity:           mins  65814;     Gait Training:            mins  62913;     Ultrasound:           mins  47886;    Electrical Stimulation:    15     mins  79749 ( );  Dry Needling           mins self-pay    Timed Treatment:   55   mins   Total Treatment:     80   mins    Arlene Silverman PT  Physical Therapist

## 2018-07-30 ENCOUNTER — TELEPHONE (OUTPATIENT)
Dept: NEUROSURGERY | Facility: CLINIC | Age: 73
End: 2018-07-30

## 2018-07-30 ENCOUNTER — TREATMENT (OUTPATIENT)
Dept: PHYSICAL THERAPY | Facility: CLINIC | Age: 73
End: 2018-07-30

## 2018-07-30 DIAGNOSIS — Z98.890 STATUS POST LUMBAR DISCECTOMY: Primary | ICD-10-CM

## 2018-07-30 PROCEDURE — 97110 THERAPEUTIC EXERCISES: CPT | Performed by: PHYSICAL THERAPIST

## 2018-07-30 PROCEDURE — 97112 NEUROMUSCULAR REEDUCATION: CPT | Performed by: PHYSICAL THERAPIST

## 2018-07-30 PROCEDURE — G0283 ELEC STIM OTHER THAN WOUND: HCPCS | Performed by: PHYSICAL THERAPIST

## 2018-07-30 PROCEDURE — 97530 THERAPEUTIC ACTIVITIES: CPT | Performed by: PHYSICAL THERAPIST

## 2018-07-30 NOTE — PROGRESS NOTES
"Subjective   Patient ID: Giselle Bundy is a 72 y.o. female is here today for follow-up. She is 6 weeks out from having a  left L4-5  metrix decompression by Dr. Craft 6/15/18. The patient has been going to physical therapy and tolerating it well.  On Sunday she began noticing some significant swelling at the lower portion of the incision. She described it as looking like a \"bird egg.\" She is now complaining of drainage of clear fluid from the incision.  Overall her preoperative leg pain symptoms have gotten better. She denies any nausea, vomiting, or fevers. She is currently taking Neurontin 300 mg TID. On a scale of 0-10, she rates her pain a 1.    Ms. Bundy is being seen today for incisional swelling and drainage. She denies any fever or chills.       Wound Check   She was originally treated more than 14 days ago. The maximum temperature noted was less than 100.4 F. There has been no drainage from the wound. There is no redness present. The swelling has improved. There is new pain present. She has no difficulty moving the affected extremity or digit.           Review of Systems   Respiratory: Negative for chest tightness and shortness of breath.    Cardiovascular: Negative for chest pain.   Musculoskeletal: Positive for back pain.   All other systems reviewed and are negative.      Objective   Physical Exam   Constitutional: She appears well-developed. No distress.   Skin: Skin is warm and dry.   Lumbar incision is well approximated.  There is a pinpoint area of yellowish exudate at the distal end of the incision.  There is surrounding swelling.  It is mildly tender and clear fluid was expressed with palpation.     Psychiatric: She has a normal mood and affect.   Vitals reviewed.    Neurologic Exam    Assessment/Plan      Medical Decision Making:      Ms. Bundy returns to the office with the above complaints.  She notes some swelling that developed along her incision line on Sunday which was 3 days ago.  " "She also reported having some intermittent headaches but stated that those are usually related to her \"sinuses.\"  The headaches were not positional in nature.  She denied any fever, chills, photophobia, nausea, or vomiting.  Last evening she began noticing clear fluid leaking from the bottom portion of the lumbar incision.  The swelling surrounding the incision has gone down somewhat but continues to be present.    The lumbar incision is well approximated except for a very small pinhole opening at the distal end of the incision.  There is a very small amount of yellowish exudate noted at this site.  With mild palpation clear fluid is produced.  There is no purulence or blood noted in this fluid.  The lumbar wound is mildly tender.    The patient is not toxic in appearance however the fluid is very suspicious for CSF.  The patient did not have a CSF leak during surgery.  I discussed the above findings with Dr. Craft.  Given the history and the character of the drainage, the patient will be admitted to the hospital for observation.  An MRI of the lumbar spine with and without contrast as well as lab work will be performed.  Dr. Craft's to evaluate the patient this evening. Further recommendations are to follow the MRI.       Giselle was seen today for post-op.    Diagnoses and all orders for this visit:    Surgery follow-up examination    Wound drainage      Admitted to hospital for observation.    No Follow-up on file.                 "

## 2018-07-30 NOTE — TELEPHONE ENCOUNTER
Patient called this afternoon and stated that she was having swelling around her incision that reminded her of a bird egg with the way that it is shaped. She denies any fever or drainage. She took her gabapentin 300 mg and Excedrin 250 mg around 3:45 pm yesterday and took a nap and woke up wit her incision swollen. She wanted to know if this was normal and if it was okay for her to go to PT today. She is 6 weeks out from having a left L4-5 metrix decompression by Dr. Craft 6/15/18.     Per verbal conversation with Yazmin, she said that it is fine for her to go to PT as long as there is no drainage or fever. She would also like for patient to be scheduled for an appointment with Geno for in the morning. She is scheduled to see Geno on 07/31/2018 at 9:30 am. Patient is aware of her appointment and that it is okay for her to go to her PT appointment. I told her that if she has any other problems to call the office.

## 2018-07-30 NOTE — PROGRESS NOTES
Re-Assessment / Re-Certification    Time In 2:00     Time Out 3:00    Patient: Giselle Bundy   : 1945  Diagnosis/ICD-10 Code:  Status post lumbar discectomy [Z98.890]  Referring practitioner: Yazmin Nj PA-C  Date of Initial Visit: 2018  Today's Date: 2018  Patient seen for 5 sessions      Subjective:   Giselle Bundy reports: she laid down for a nap this weekend and when she got up had swelling and tenderness at the incision.  No change in activity but had been wearing a girdle.     No fever.     Subjective   Objective   Small area of edema surrounding incision.  No exudate present.  Mild erythema at distal portion of scar and it appears that a small suture is present on the outside of the incision.  Increased tenderness at incision.    Assessment/Plan   Patient presents with mild edema at incision with suture present and mild irritation. She has no increase in pain with activity and is able to participate in therapy without difficulty.       Please advise if you would like us to change any treatment.    PT Signature: Arlene Silverman, PT              Manual Therapy:          mins  47406;  Therapeutic Exercise:    20     mins  00067;     Neuromuscular Lor:   10      mins  37266;    Therapeutic Activity:     10     mins  96733;     Gait Training:            mins  96564;     Ultrasound:           mins  28965;    Electrical Stimulation:    15     mins  57500 ( );  Dry Needling           mins self-pay    Timed Treatment:   40   mins   Total Treatment:     60   mins

## 2018-07-31 ENCOUNTER — HOSPITAL ENCOUNTER (OUTPATIENT)
Facility: HOSPITAL | Age: 73
Setting detail: OBSERVATION
Discharge: HOME | End: 2018-08-01
Attending: NEUROLOGICAL SURGERY | Admitting: NEUROLOGICAL SURGERY

## 2018-07-31 ENCOUNTER — OFFICE VISIT (OUTPATIENT)
Dept: NEUROSURGERY | Facility: CLINIC | Age: 73
End: 2018-07-31

## 2018-07-31 ENCOUNTER — APPOINTMENT (OUTPATIENT)
Dept: MRI IMAGING | Facility: HOSPITAL | Age: 73
End: 2018-07-31

## 2018-07-31 VITALS
DIASTOLIC BLOOD PRESSURE: 72 MMHG | SYSTOLIC BLOOD PRESSURE: 128 MMHG | TEMPERATURE: 97.5 F | BODY MASS INDEX: 19.31 KG/M2 | WEIGHT: 109 LBS | HEIGHT: 63 IN

## 2018-07-31 DIAGNOSIS — L24.A9 WOUND DRAINAGE: ICD-10-CM

## 2018-07-31 DIAGNOSIS — Z09 SURGERY FOLLOW-UP EXAMINATION: ICD-10-CM

## 2018-07-31 DIAGNOSIS — Z09 SURGERY FOLLOW-UP EXAMINATION: Primary | ICD-10-CM

## 2018-07-31 DIAGNOSIS — L24.A9 WOUND DRAINAGE: Primary | ICD-10-CM

## 2018-07-31 LAB
ANION GAP SERPL CALCULATED.3IONS-SCNC: 11.5 MMOL/L
APTT PPP: 30.8 SECONDS (ref 22.7–35.4)
BUN BLD-MCNC: 15 MG/DL (ref 8–23)
BUN/CREAT SERPL: 22.1 (ref 7–25)
CALCIUM SPEC-SCNC: 9.4 MG/DL (ref 8.6–10.5)
CHLORIDE SERPL-SCNC: 101 MMOL/L (ref 98–107)
CO2 SERPL-SCNC: 27.5 MMOL/L (ref 22–29)
CREAT BLD-MCNC: 0.68 MG/DL (ref 0.57–1)
CRP SERPL-MCNC: 0.11 MG/DL (ref 0–0.5)
DEPRECATED RDW RBC AUTO: 47.1 FL (ref 37–54)
ERYTHROCYTE [DISTWIDTH] IN BLOOD BY AUTOMATED COUNT: 13 % (ref 11.7–13)
ERYTHROCYTE [SEDIMENTATION RATE] IN BLOOD: 17 MM/HR (ref 0–30)
GFR SERPL CREATININE-BSD FRML MDRD: 85 ML/MIN/1.73
GLUCOSE BLD-MCNC: 96 MG/DL (ref 65–99)
HCT VFR BLD AUTO: 39.9 % (ref 35.6–45.5)
HGB BLD-MCNC: 12.3 G/DL (ref 11.9–15.5)
INR PPP: 1.04 (ref 0.9–1.1)
MCH RBC QN AUTO: 30.4 PG (ref 26.9–32)
MCHC RBC AUTO-ENTMCNC: 30.8 G/DL (ref 32.4–36.3)
MCV RBC AUTO: 98.5 FL (ref 80.5–98.2)
PLATELET # BLD AUTO: 253 10*3/MM3 (ref 140–500)
PMV BLD AUTO: 10 FL (ref 6–12)
POTASSIUM BLD-SCNC: 3.8 MMOL/L (ref 3.5–5.2)
PROTHROMBIN TIME: 13.4 SECONDS (ref 11.7–14.2)
RBC # BLD AUTO: 4.05 10*6/MM3 (ref 3.9–5.2)
SODIUM BLD-SCNC: 140 MMOL/L (ref 136–145)
WBC NRBC COR # BLD: 7.4 10*3/MM3 (ref 4.5–10.7)

## 2018-07-31 PROCEDURE — G0378 HOSPITAL OBSERVATION PER HR: HCPCS

## 2018-07-31 PROCEDURE — 99024 POSTOP FOLLOW-UP VISIT: CPT | Performed by: NURSE PRACTITIONER

## 2018-07-31 PROCEDURE — A9577 INJ MULTIHANCE: HCPCS | Performed by: NEUROLOGICAL SURGERY

## 2018-07-31 PROCEDURE — 0 GADOBENATE DIMEGLUMINE 529 MG/ML SOLUTION: Performed by: NEUROLOGICAL SURGERY

## 2018-07-31 PROCEDURE — 85610 PROTHROMBIN TIME: CPT | Performed by: NURSE PRACTITIONER

## 2018-07-31 PROCEDURE — 80048 BASIC METABOLIC PNL TOTAL CA: CPT | Performed by: NURSE PRACTITIONER

## 2018-07-31 PROCEDURE — 85652 RBC SED RATE AUTOMATED: CPT | Performed by: NURSE PRACTITIONER

## 2018-07-31 PROCEDURE — 85027 COMPLETE CBC AUTOMATED: CPT | Performed by: NURSE PRACTITIONER

## 2018-07-31 PROCEDURE — 85730 THROMBOPLASTIN TIME PARTIAL: CPT | Performed by: NURSE PRACTITIONER

## 2018-07-31 PROCEDURE — G0379 DIRECT REFER HOSPITAL OBSERV: HCPCS

## 2018-07-31 PROCEDURE — 86140 C-REACTIVE PROTEIN: CPT | Performed by: NURSE PRACTITIONER

## 2018-07-31 PROCEDURE — 72158 MRI LUMBAR SPINE W/O & W/DYE: CPT

## 2018-07-31 RX ORDER — PANTOPRAZOLE SODIUM 40 MG/1
40 TABLET, DELAYED RELEASE ORAL DAILY
Status: DISCONTINUED | OUTPATIENT
Start: 2018-07-31 | End: 2018-07-31 | Stop reason: SDUPTHER

## 2018-07-31 RX ORDER — LORAZEPAM 0.5 MG/1
0.5 TABLET ORAL EVERY MORNING
Status: DISCONTINUED | OUTPATIENT
Start: 2018-08-01 | End: 2018-08-01 | Stop reason: HOSPADM

## 2018-07-31 RX ORDER — GABAPENTIN 300 MG/1
300 CAPSULE ORAL 3 TIMES DAILY
Status: DISCONTINUED | OUTPATIENT
Start: 2018-07-31 | End: 2018-08-01 | Stop reason: HOSPADM

## 2018-07-31 RX ORDER — CALCIUM CARBONATE 500(1250)
500 TABLET ORAL DAILY
Status: DISCONTINUED | OUTPATIENT
Start: 2018-07-31 | End: 2018-08-01 | Stop reason: HOSPADM

## 2018-07-31 RX ORDER — PANTOPRAZOLE SODIUM 40 MG/1
40 TABLET, DELAYED RELEASE ORAL DAILY
COMMUNITY
End: 2018-09-19

## 2018-07-31 RX ORDER — ACETAMINOPHEN 325 MG/1
650 TABLET ORAL EVERY 4 HOURS PRN
Status: DISCONTINUED | OUTPATIENT
Start: 2018-07-31 | End: 2018-08-01 | Stop reason: HOSPADM

## 2018-07-31 RX ORDER — HYDROCODONE BITARTRATE AND ACETAMINOPHEN 5; 325 MG/1; MG/1
1 TABLET ORAL EVERY 4 HOURS PRN
Status: DISCONTINUED | OUTPATIENT
Start: 2018-07-31 | End: 2018-08-01 | Stop reason: HOSPADM

## 2018-07-31 RX ORDER — ONDANSETRON 2 MG/ML
4 INJECTION INTRAMUSCULAR; INTRAVENOUS EVERY 6 HOURS PRN
Status: DISCONTINUED | OUTPATIENT
Start: 2018-07-31 | End: 2018-08-01 | Stop reason: HOSPADM

## 2018-07-31 RX ORDER — DOCUSATE SODIUM 100 MG/1
100 CAPSULE, LIQUID FILLED ORAL 2 TIMES DAILY
Status: DISCONTINUED | OUTPATIENT
Start: 2018-07-31 | End: 2018-08-01 | Stop reason: HOSPADM

## 2018-07-31 RX ORDER — ONDANSETRON 4 MG/1
4 TABLET, ORALLY DISINTEGRATING ORAL EVERY 6 HOURS PRN
Status: DISCONTINUED | OUTPATIENT
Start: 2018-07-31 | End: 2018-08-01 | Stop reason: HOSPADM

## 2018-07-31 RX ORDER — FLUTICASONE PROPIONATE 50 MCG
2 SPRAY, SUSPENSION (ML) NASAL DAILY
Status: DISCONTINUED | OUTPATIENT
Start: 2018-07-31 | End: 2018-08-01 | Stop reason: HOSPADM

## 2018-07-31 RX ORDER — SODIUM CHLORIDE 0.9 % (FLUSH) 0.9 %
1-10 SYRINGE (ML) INJECTION AS NEEDED
Status: DISCONTINUED | OUTPATIENT
Start: 2018-07-31 | End: 2018-08-01 | Stop reason: HOSPADM

## 2018-07-31 RX ORDER — ACETAMINOPHEN, ASPIRIN AND CAFFEINE 250; 250; 65 MG/1; MG/1; MG/1
2 TABLET, FILM COATED ORAL EVERY 6 HOURS PRN
COMMUNITY
End: 2018-09-19

## 2018-07-31 RX ORDER — CETIRIZINE HYDROCHLORIDE, PSEUDOEPHEDRINE HYDROCHLORIDE 5; 120 MG/1; MG/1
1 TABLET, FILM COATED, EXTENDED RELEASE ORAL DAILY
Status: DISCONTINUED | OUTPATIENT
Start: 2018-07-31 | End: 2018-08-01 | Stop reason: HOSPADM

## 2018-07-31 RX ORDER — ALUMINA, MAGNESIA, AND SIMETHICONE 2400; 2400; 240 MG/30ML; MG/30ML; MG/30ML
15 SUSPENSION ORAL EVERY 6 HOURS PRN
Status: DISCONTINUED | OUTPATIENT
Start: 2018-07-31 | End: 2018-08-01 | Stop reason: HOSPADM

## 2018-07-31 RX ORDER — UREA 10 %
1250 LOTION (ML) TOPICAL DAILY
Status: DISCONTINUED | OUTPATIENT
Start: 2018-07-31 | End: 2018-07-31 | Stop reason: CLARIF

## 2018-07-31 RX ORDER — PANTOPRAZOLE SODIUM 40 MG/1
40 TABLET, DELAYED RELEASE ORAL EVERY MORNING
Status: DISCONTINUED | OUTPATIENT
Start: 2018-08-01 | End: 2018-08-01 | Stop reason: HOSPADM

## 2018-07-31 RX ORDER — BISACODYL 10 MG
10 SUPPOSITORY, RECTAL RECTAL DAILY PRN
Status: DISCONTINUED | OUTPATIENT
Start: 2018-07-31 | End: 2018-08-01 | Stop reason: HOSPADM

## 2018-07-31 RX ORDER — FLUOXETINE HYDROCHLORIDE 20 MG/1
40 CAPSULE ORAL EVERY MORNING
Status: DISCONTINUED | OUTPATIENT
Start: 2018-08-01 | End: 2018-08-01 | Stop reason: HOSPADM

## 2018-07-31 RX ORDER — ATORVASTATIN CALCIUM 20 MG/1
40 TABLET, FILM COATED ORAL DAILY
Status: DISCONTINUED | OUTPATIENT
Start: 2018-07-31 | End: 2018-08-01 | Stop reason: HOSPADM

## 2018-07-31 RX ORDER — TRAZODONE HYDROCHLORIDE 50 MG/1
25 TABLET ORAL NIGHTLY
Status: DISCONTINUED | OUTPATIENT
Start: 2018-07-31 | End: 2018-08-01 | Stop reason: HOSPADM

## 2018-07-31 RX ORDER — MAGNESIUM OXIDE 400 MG/1
400 TABLET ORAL DAILY
Status: DISCONTINUED | OUTPATIENT
Start: 2018-07-31 | End: 2018-08-01 | Stop reason: HOSPADM

## 2018-07-31 RX ORDER — ONDANSETRON 4 MG/1
4 TABLET, FILM COATED ORAL EVERY 6 HOURS PRN
Status: DISCONTINUED | OUTPATIENT
Start: 2018-07-31 | End: 2018-08-01 | Stop reason: HOSPADM

## 2018-07-31 RX ADMIN — GABAPENTIN 300 MG: 300 CAPSULE ORAL at 16:26

## 2018-07-31 RX ADMIN — ACETAMINOPHEN 650 MG: 325 TABLET, FILM COATED ORAL at 16:27

## 2018-07-31 RX ADMIN — GADOBENATE DIMEGLUMINE 9 ML: 529 INJECTION, SOLUTION INTRAVENOUS at 15:34

## 2018-07-31 RX ADMIN — GABAPENTIN 300 MG: 300 CAPSULE ORAL at 21:32

## 2018-07-31 RX ADMIN — TRAZODONE HYDROCHLORIDE 25 MG: 50 TABLET ORAL at 21:32

## 2018-07-31 RX ADMIN — ACETAMINOPHEN 650 MG: 325 TABLET, FILM COATED ORAL at 21:58

## 2018-07-31 RX ADMIN — HYDROCODONE BITARTRATE AND ACETAMINOPHEN 1 TABLET: 5; 325 TABLET ORAL at 21:31

## 2018-08-01 VITALS
HEART RATE: 72 BPM | BODY MASS INDEX: 19.31 KG/M2 | SYSTOLIC BLOOD PRESSURE: 142 MMHG | OXYGEN SATURATION: 96 % | HEIGHT: 63 IN | DIASTOLIC BLOOD PRESSURE: 73 MMHG | WEIGHT: 109 LBS | RESPIRATION RATE: 16 BRPM | TEMPERATURE: 98 F

## 2018-08-01 LAB
BILIRUB UR QL STRIP: NEGATIVE
CLARITY UR: CLEAR
COLOR UR: YELLOW
DEPRECATED RDW RBC AUTO: 47.5 FL (ref 37–54)
ERYTHROCYTE [DISTWIDTH] IN BLOOD BY AUTOMATED COUNT: 13.2 % (ref 11.7–13)
GLUCOSE UR STRIP-MCNC: NEGATIVE MG/DL
HCT VFR BLD AUTO: 38.1 % (ref 35.6–45.5)
HGB BLD-MCNC: 12 G/DL (ref 11.9–15.5)
HGB UR QL STRIP.AUTO: NEGATIVE
KETONES UR QL STRIP: NEGATIVE
LEUKOCYTE ESTERASE UR QL STRIP.AUTO: NEGATIVE
MCH RBC QN AUTO: 30.8 PG (ref 26.9–32)
MCHC RBC AUTO-ENTMCNC: 31.5 G/DL (ref 32.4–36.3)
MCV RBC AUTO: 97.9 FL (ref 80.5–98.2)
NITRITE UR QL STRIP: NEGATIVE
PH UR STRIP.AUTO: 7 [PH] (ref 5–8)
PLATELET # BLD AUTO: 272 10*3/MM3 (ref 140–500)
PMV BLD AUTO: 10.5 FL (ref 6–12)
PROT UR QL STRIP: NEGATIVE
RBC # BLD AUTO: 3.89 10*6/MM3 (ref 3.9–5.2)
SP GR UR STRIP: 1.01 (ref 1–1.03)
UROBILINOGEN UR QL STRIP: NORMAL
WBC NRBC COR # BLD: 7.65 10*3/MM3 (ref 4.5–10.7)

## 2018-08-01 PROCEDURE — G0378 HOSPITAL OBSERVATION PER HR: HCPCS

## 2018-08-01 PROCEDURE — 81003 URINALYSIS AUTO W/O SCOPE: CPT | Performed by: NURSE PRACTITIONER

## 2018-08-01 PROCEDURE — 85027 COMPLETE CBC AUTOMATED: CPT | Performed by: NURSE PRACTITIONER

## 2018-08-01 RX ORDER — CEPHALEXIN 500 MG/1
500 CAPSULE ORAL 3 TIMES DAILY
Qty: 15 CAPSULE | Refills: 0 | Status: SHIPPED | OUTPATIENT
Start: 2018-08-01 | End: 2018-08-06

## 2018-08-01 RX ADMIN — LORAZEPAM 0.5 MG: 0.5 TABLET ORAL at 06:51

## 2018-08-01 RX ADMIN — GABAPENTIN 300 MG: 300 CAPSULE ORAL at 08:21

## 2018-08-01 RX ADMIN — Medication 400 MG: at 08:22

## 2018-08-01 RX ADMIN — FLUOXETINE HYDROCHLORIDE 40 MG: 20 CAPSULE ORAL at 06:50

## 2018-08-01 RX ADMIN — ACETAMINOPHEN 650 MG: 325 TABLET, FILM COATED ORAL at 08:25

## 2018-08-01 RX ADMIN — PANTOPRAZOLE SODIUM 40 MG: 40 TABLET, DELAYED RELEASE ORAL at 06:51

## 2018-08-01 RX ADMIN — CALCIUM 500 MG: 500 TABLET ORAL at 08:22

## 2018-08-01 NOTE — DISCHARGE SUMMARY
Giselle Bundy  1945    Patient Care Team:  Zacarias Araiza MD as PCP - General (General Practice)  Cindy Melendez MD as Surgeon (Orthopedic Surgery)  Richard Cameron MD as Consulting Physician (Psychiatry)  Irwin Craft MD as Surgeon (Neurosurgery)    Date of Admit: 7/31/2018    Date of Discharge:  8/1/2018    Discharge Diagnosis:Active Problems:    Wound drainage          Condition on Discharge: stable    Discharge disposition: home    Pertinent Test Results: MRI lumbar spine w/wo contrast, viewed by Dr. Craft, revealed no evidence of CSF leak.     Brief HPI: This is a very pleasant 73 yo female with history of left L4-5  metrix decompression by Dr. Craft 6/15/18. She began noticing swelling of the lumbar incision last Sunday which progressed to leakage of clear fluid the following day. She denied any headache or fever. Other than the above she was feeling quite well. She was admitted to the hospital for further work up with an MRI of the lumbar spine w/wo contrast and lab work.     Hospital Course: Imaging revealed no evidence of CSF leak or infectious process. Lab work was also normal. The incision was evaluated this morning by Dr. Craft. The drainage has resolved. The incision is mildly swollen but non-tender with palpation. Dr. Craft recommended discharge home with wound care and oral Keflex for 5 days.  The patient is feeling well.  She is having no pain symptoms.  She is voiding, ambulating without difficulty.  Vital signs have remained stable.  The patient will be discharged home today. Both she and Dr. Craft on agreement with the plan.  The patient will be reevaluated in the office next week.      Temp:  [97.7 °F (36.5 °C)-98.7 °F (37.1 °C)] 98 °F (36.7 °C)  Heart Rate:  [72-78] 72  Resp:  [16-18] 16  BP: (136-162)/(73-85) 142/73    Current labs:  Lab Results (last 24 hours)     Procedure Component Value Units Date/Time    Basic Metabolic Panel [[id number]] Collected:  07/31/18  1312    Specimen:  Blood Updated:  07/31/18 1410     Glucose 96 mg/dL      BUN 15 mg/dL      Creatinine 0.68 mg/dL      Sodium 140 mmol/L      Potassium 3.8 mmol/L      Chloride 101 mmol/L      CO2 27.5 mmol/L      Calcium 9.4 mg/dL      eGFR Non African Amer 85 mL/min/1.73      BUN/Creatinine Ratio 22.1     Anion Gap 11.5 mmol/L     Narrative:       The MDRD GFR formula is only valid for adults with stable renal function between ages 18 and 70.    CBC (No Diff) [[id number]]  (Abnormal) Collected:  07/31/18 1312    Specimen:  Blood Updated:  07/31/18 1333     WBC 7.40 10*3/mm3      RBC 4.05 10*6/mm3      Hemoglobin 12.3 g/dL      Hematocrit 39.9 %      MCV 98.5 (H) fL      MCH 30.4 pg      MCHC 30.8 (L) g/dL      RDW 13.0 %      RDW-SD 47.1 fl      MPV 10.0 fL      Platelets 253 10*3/mm3     aPTT [[id number]]  (Normal) Collected:  07/31/18 1312    Specimen:  Blood Updated:  07/31/18 1341     PTT 30.8 seconds     Protime-INR [[id number]]  (Normal) Collected:  07/31/18 1312    Specimen:  Blood Updated:  07/31/18 1341     Protime 13.4 Seconds      INR 1.04    C-reactive Protein [[id number]]  (Normal) Collected:  07/31/18 1312    Specimen:  Blood Updated:  07/31/18 1410     C-Reactive Protein 0.11 mg/dL     Sedimentation Rate [[id number]]  (Normal) Collected:  07/31/18 1312    Specimen:  Blood Updated:  07/31/18 1423     Sed Rate 17 mm/hr     CBC (No Diff) [[id number]]  (Abnormal) Collected:  08/01/18 0423    Specimen:  Blood Updated:  08/01/18 0538     WBC 7.65 10*3/mm3      RBC 3.89 (L) 10*6/mm3      Hemoglobin 12.0 g/dL      Hematocrit 38.1 %      MCV 97.9 fL      MCH 30.8 pg      MCHC 31.5 (L) g/dL      RDW 13.2 (H) %      RDW-SD 47.5 fl      MPV 10.5 fL      Platelets 272 10*3/mm3     Urinalysis With Microscopic If Indicated (No Culture) - Urine, Clean Catch [[id number]]  (Normal) Collected:  08/01/18 0708    Specimen:  Urine from Urine, Clean Catch Updated:  08/01/18 0739     Color, UA Yellow     Appearance, UA Clear      pH, UA 7.0     Specific Gravity, UA 1.012     Glucose, UA Negative     Ketones, UA Negative     Bilirubin, UA Negative     Blood, UA Negative     Protein, UA Negative     Leuk Esterase, UA Negative     Nitrite, UA Negative     Urobilinogen, UA 0.2 E.U./dL    Narrative:       Urine microscopic not indicated.          Discharge Medications  Matt has been reviewed and narcotic consent is on file in the patient's chart.     Your medication list      START taking these medications      Instructions Last Dose Given Next Dose Due   cephalexin 500 MG capsule  Commonly known as:  KEFLEX      Take 1 capsule by mouth 3 (Three) Times a Day for 5 days.          CONTINUE taking these medications      Instructions Last Dose Given Next Dose Due   aspirin-acetaminophen-caffeine 250-250-65 MG per tablet  Commonly known as:  EXCEDRIN MIGRAINE      Take 2 tablets by mouth Every 6 (Six) Hours As Needed for Headache, Mild Pain  or Moderate Pain .       atorvastatin 20 MG tablet  Commonly known as:  LIPITOR      Take 40 mg by mouth Daily.       CALCIUM 1200 PO      Take 1 tablet by mouth Daily As Needed.       CLARITIN-D 24 HOUR  MG per 24 hr tablet  Generic drug:  loratadine-pseudoephedrine      Take 1 tablet by mouth Daily As Needed.       erythromycin 5 MG/GM ophthalmic ointment  Commonly known as:  ROMYCIN      APPLY 1 CM RIBBON INTO LOWER CONJUCTIVAL SAC IN OS QPM PRN       esomeprazole 20 MG capsule  Commonly known as:  nexIUM      Take 20 mg by mouth Every Morning Before Breakfast.       flunisolide 25 MCG/ACT (0.025%) solution nasal spray  Commonly known as:  NASALIDE      Inhale 2 sprays Daily As Needed.       FLUoxetine 20 MG capsule  Commonly known as:  PROzac      Take 40 mg by mouth Every Morning.       gabapentin 300 MG capsule  Commonly known as:  NEURONTIN      Take 1 capsule by mouth 3 (Three) Times a Day. 3 month supply       LORazepam 0.5 MG tablet  Commonly known as:  ATIVAN      Take 0.5 mg by mouth Every  Morning.       magnesium oxide 400 MG tablet  Commonly known as:  MAG-OX      Take 400 mg by mouth Daily As Needed.       pantoprazole 40 MG EC tablet  Commonly known as:  PROTONIX      Take 40 mg by mouth Daily.       traZODone 50 MG tablet  Commonly known as:  DESYREL      Take 25 mg by mouth Every Night.             Where to Get Your Medications      These medications were sent to [email or web address] [address] [place] [address] AT [address] of [address] - [phone]  - [phone] [address] [address]    Phone:  [phone]   · cephalexin 500 MG capsule         Discharge Diet:     Diet Order   Procedures   • Diet Regular       Activity at Discharge:   Activity Instructions     Discharge Activity Restrictions       1) No bending or twisting.   2) OK to sit as tolerated.  3) May shower. Pat incision dry.  4) Do not lift over 10 lbs.          Call for: Incisional swelling, redness, drainage, positional headache, T>100.5, questions or concerns.     Follow-up Appointments  Future Appointments  Date Time Provider Department Center   8/1/2018 1:00 PM Arlene E Gold, PT [facility] None   8/3/2018 2:00 PM Arlene E Gold, PT [facility] None   8/7/2018 1:45 PM Yazmin Nj PA-C [facility] None   8/8/2018 1:00 PM Arlene E Gold, PT [facility] None   8/10/2018 1:00 PM Arlene E Gold, PT [facility] None   8/15/2018 1:00 PM Arlene E Gold, PT [facility] None   8/17/2018 1:00 PM Arlene E Gold, PT [facility] None   8/22/2018 1:30 PM Arlene E Gold, PT [facility] None   8/24/2018 1:00 PM Arlene E Gold, PT [facility] None   8/29/2018 2:00 PM Arlene E Gold, PT [facility] None   8/31/2018 1:00 PM Arlene E Gold, PT [facility] None   9/19/2018 2:00 PM Irwin Craft MD [facility] None     Follow-up Information     Zacarias Araiza MD .    Specialty:  General Practice  Contact information:  [address]  [facility] [address] [phone]                   I discussed the  discharge instructions with patient    Geno Rausch, APRN  08/01/18  10:44 AM

## 2018-08-01 NOTE — DISCHARGE INSTRUCTIONS
Activity Instructions      Discharge Activity Restrictions        1) No bending or twisting.   2) OK to sit as tolerated.  3) May shower. Pat incision dry.  4) Do not lift over 10 lbs.

## 2018-08-01 NOTE — PLAN OF CARE
Problem: Patient Care Overview  Goal: Plan of Care Review  Outcome: Ongoing (interventions implemented as appropriate)   07/31/18 2315 08/01/18 0322   Coping/Psychosocial   Plan of Care Reviewed With patient --    OTHER   Outcome Summary --  Gauze dressing in place, scant new drainage noted. Denies pain. VSS. Afebrile. Ambulates independently; tolerates well. Pending bedside I&D.   Plan of Care Review   Progress --  no change       Problem: Skin and Soft Tissue Infection (Adult)  Goal: Signs and Symptoms of Listed Potential Problems Will be Absent, Minimized or Managed (Skin and Soft Tissue Infection)  Outcome: Ongoing (interventions implemented as appropriate)   08/01/18 0322   Goal/Outcome Evaluation   Problems Assessed (Skin and Soft Tissue Infection) other (see comments);pain;situational response  (Drainage from incision)   Problems Present (Skin/Soft Tissue Inf) other (see comments)  (Drainage from incision)

## 2018-08-02 NOTE — PROGRESS NOTES
Subjective   Patient ID: Giselle Bunyd is a 72 y.o. female is here today for follow-up wound check.  She is 7 weeks out from a left L4-5  metrix decompression by Dr. Craft 6/15/18.  She was admitted to the hospital last visit due to having swelling and drainage at incision. She states the incision has gone down since last visit.  She denies any redness,drainage or swelling. She still has throbbing sensation in left hip. Heat does help. Mrs. Bundy is still holding off on PT due to incision issue. She takes Tylenol 600 mg prn and Gabapentin 300 mg TID and       Leg Pain    The pain is present in the left hip. Quality: throbbing  The pain is at a severity of 2/10. The pain is mild.       The following portions of the patient's history were reviewed and updated as appropriate: allergies, current medications, past family history, past medical history, past social history, past surgical history and problem list.    Review of Systems   All other systems reviewed and are negative.      Objective   Physical Exam   Neurological:   Incision ok     Neurologic Exam    Assessment/Plan   Independent Review of Radiographic Studies:      Medical Decision Making:    Ms. Bundy is 7 weeks out from a left L4-L5 Metrix decompression.  She did very well after surgery but continued to have some mild left radicular symptoms.  Last week she was admitted to the hospital overnight due to some transient wound swelling and drainage.  Her MRI did not reveal any evidence of CSF leak or other infection.  The swelling and drainage have completely resolved and her wound looks great today.  She completed antibiotics yesterday.  She still has some radicular pain but it is mild and intermittent.  It is well-controlled with gabapentin 300 mg 3 times a day as well as Tylenol or Excedrin as needed.  She has not been going to physical therapy for the past week and has been less active than usual and thinks that her pain is a little exacerbated  because of that.  I suggested that she get back into physical therapy.  We did discuss her restrictions.  She will follow-up in 3 weeks.  Giselle was seen today for post-op and leg pain.    Diagnoses and all orders for this visit:    Surgery follow-up examination      Return in about 3 weeks (around 8/28/2018).

## 2018-08-07 ENCOUNTER — OFFICE VISIT (OUTPATIENT)
Dept: NEUROSURGERY | Facility: CLINIC | Age: 73
End: 2018-08-07

## 2018-08-07 VITALS
HEIGHT: 63 IN | HEART RATE: 77 BPM | SYSTOLIC BLOOD PRESSURE: 146 MMHG | DIASTOLIC BLOOD PRESSURE: 83 MMHG | BODY MASS INDEX: 19.31 KG/M2 | WEIGHT: 109 LBS

## 2018-08-07 DIAGNOSIS — Z09 SURGERY FOLLOW-UP EXAMINATION: Primary | ICD-10-CM

## 2018-08-07 PROCEDURE — 99024 POSTOP FOLLOW-UP VISIT: CPT | Performed by: PHYSICIAN ASSISTANT

## 2018-08-07 RX ORDER — IBUPROFEN 600 MG/1
600 TABLET ORAL EVERY 6 HOURS PRN
COMMUNITY
End: 2018-09-19

## 2018-08-08 ENCOUNTER — TREATMENT (OUTPATIENT)
Dept: PHYSICAL THERAPY | Facility: CLINIC | Age: 73
End: 2018-08-08

## 2018-08-08 DIAGNOSIS — Z98.890 STATUS POST LUMBAR DISCECTOMY: Primary | ICD-10-CM

## 2018-08-08 PROCEDURE — 97110 THERAPEUTIC EXERCISES: CPT | Performed by: PHYSICAL THERAPIST

## 2018-08-08 PROCEDURE — G0283 ELEC STIM OTHER THAN WOUND: HCPCS | Performed by: PHYSICAL THERAPIST

## 2018-08-08 PROCEDURE — 97530 THERAPEUTIC ACTIVITIES: CPT | Performed by: PHYSICAL THERAPIST

## 2018-08-08 NOTE — PROGRESS NOTES
Re-Assessment / Re-Certification    Time In 1:00     Time Out 2:00    Patient: Giselle Bundy   : 1945  Diagnosis/ICD-10 Code:  Status post lumbar discectomy [Z98.890]  Referring practitioner: Yazmin Nj PA-C  Date of Initial Visit: 2018  Today's Date: 2018  Patient seen for 6 sessions      Subjective:   Giselle Bundy reports:  slight tingling occasionally in leg, left hip throbbing sensation.  Took week off due to swelling around incision, admitted to hospital.  Six months before I can ride again.  Ok to start therapy.     Clinical Progress: improved  Home Program Compliance: Yes  Treatment has included: therapeutic exercise, neuromuscular re-education, manual therapy, therapeutic activity, gait training, electrical stimulation and cryotherapy    Subjective Evaluation    Pain  Current pain ratin         Objective       Postural Observations    Additional Postural Observation Details  Well healed incision, suture still present.  No exudate, edema resolved.    Palpation   Left   Tenderness of the erector spinae.     Right Tenderness of the erector spinae.     Additional Palpation Details  Left piriformis    Neurological Testing     Sensation     Lumbar   Left   Intact: light touch    Right   Intact: light touch    Strength/Myotome Testing     Left Hip   Planes of Motion   Flexion: 4+    Left Knee   Flexion: 4+  Extension: 5    Left Ankle/Foot   Dorsiflexion: 4+  Plantar flexion: 4+  Great toe extension: 5    Tests     Lumbar     Left   Negative passive SLR.     Right   Negative passive SLR.      Assessment/Plan   Patient returns after hospital admission to r/o CSF leak.  None found on MRI.  Released by MD to return to therapy.  Patient noted to have healed incision with no s/s of infection or edema today.  More paraspinals tenderness/soreness.  No increased radicular, just muscular tenderness in left piriformis.  Will proceed slowly with return to prior exercises.  Progress toward previous  goals: Not Met    New Goals  Short-term goals (STG): continue   Long-term goals (LTG): continue      Recommendations: Continue as planned  Timeframe: 1 month  Prognosis to achieve goals: good    PT Signature: Arlene Silverman, PT      Based upon review of the patient's progress and continued therapy plan, it is my medical opinion that Giselle Bundy should continue physical therapy treatment at [facility]  [address] [place] [address] [phone].    Signature: __________________________________  Yazmin Nj PA-C    Manual Therapy:          mins  16040;  Therapeutic Exercise:    15     mins  42545;     Neuromuscular Lor:         mins  95697;    Therapeutic Activity:      15     mins  06091;     Gait Training:            mins  92171;     Ultrasound:           mins  62487;    Electrical Stimulation:    15     mins  05938 ( );  Dry Needling           mins self-pay    Timed Treatment:   30   mins   Total Treatment:     60   mins

## 2018-08-08 NOTE — PROGRESS NOTES
Physical Therapy Daily Progress Note    Time In 1:00  Time Out ***    Visit # : 6  Giselle Bundy reports: slight tingling occasionally in leg, left hip throbbing sensation.  Took week off du to swelling around incision, admitted to hospital.  Six months before I can ride again.    Subjective     Objective   See Exercise, Manual, and Modality Logs for complete treatment.   Well healing     Assessment/Plan    {AMB PT PLAN (SOAP Note):46736}           Manual Therapy:    ***     mins  97320;  Therapeutic Exercise:    ***     mins  33049;     Neuromuscular Lor:    ***    mins  03545;    Therapeutic Activity:     ***     mins  85187;     Gait Training:      ***     mins  62500;     Ultrasound:     ***     mins  25378;    Electrical Stimulation:    ***     mins  96511 ( );  Dry Needling     ***     mins self-pay    Timed Treatment:   ***   mins   Total Treatment:     ***   mins    Arlene Silverman, PT  Physical Therapist

## 2018-08-10 ENCOUNTER — TREATMENT (OUTPATIENT)
Dept: PHYSICAL THERAPY | Facility: CLINIC | Age: 73
End: 2018-08-10

## 2018-08-10 DIAGNOSIS — Z98.890 STATUS POST LUMBAR DISCECTOMY: Primary | ICD-10-CM

## 2018-08-10 PROCEDURE — 97530 THERAPEUTIC ACTIVITIES: CPT | Performed by: PHYSICAL THERAPIST

## 2018-08-10 PROCEDURE — 97110 THERAPEUTIC EXERCISES: CPT | Performed by: PHYSICAL THERAPIST

## 2018-08-10 PROCEDURE — G0283 ELEC STIM OTHER THAN WOUND: HCPCS | Performed by: PHYSICAL THERAPIST

## 2018-08-10 NOTE — PROGRESS NOTES
Physical Therapy Daily Progress Note    Time In 1:06  Time Out 2:15    Visit # : 7  Giselle Bundy reports: tired and don't feel     Subjective     Objective   See Exercise, Manual, and Modality Logs for complete treatment.   Area of edema 2 X 1.3cm at incision.  No erythema, no increased heat, no tenderness.  No exudate present.    Assessment/Plan  Tolerated return to strengthening exercises.  Noted area of edema present again at incision but not soft as previous.  Will continue to monitor and patient to call MD if any changes.  Progress strengthening /stabilization /functional activity           Manual Therapy:          mins  34556;  Therapeutic Exercise:    30     mins  78009;     Neuromuscular Lor:         mins  38477;    Therapeutic Activity:     10     mins  35508;     Gait Training:            mins  57066;     Ultrasound:           mins  91174;    Electrical Stimulation:    15     mins  49193 ( );  Dry Needling           mins self-pay    Timed Treatment:   40   mins   Total Treatment:     69   mins    Arlene Silverman, PT  Physical Therapist

## 2018-08-15 ENCOUNTER — TREATMENT (OUTPATIENT)
Dept: PHYSICAL THERAPY | Facility: CLINIC | Age: 73
End: 2018-08-15

## 2018-08-15 DIAGNOSIS — Z98.890 STATUS POST LUMBAR DISCECTOMY: Primary | ICD-10-CM

## 2018-08-15 PROCEDURE — G0283 ELEC STIM OTHER THAN WOUND: HCPCS | Performed by: PHYSICAL THERAPIST

## 2018-08-15 PROCEDURE — 97530 THERAPEUTIC ACTIVITIES: CPT | Performed by: PHYSICAL THERAPIST

## 2018-08-15 PROCEDURE — 97110 THERAPEUTIC EXERCISES: CPT | Performed by: PHYSICAL THERAPIST

## 2018-08-15 NOTE — PROGRESS NOTES
Physical Therapy Daily Progress Note    Time In 1:00  Time Out 2:07    Visit # : 8  Giselle Bundy reports: more soreness in buttock.  No numbness or tingling.      Subjective     Objective   See Exercise, Manual, and Modality Logs for complete treatment.   No edema present at incision, piriformis tenderness, no tenderness at spine.  Still Trendelenburg present in standing.    Assessment/Plan  Piriformis soreness may be due to weakness in the left hip abductor as patient has increased standing and activity levels.  Weakness also in quadratus - unable to hip hike in standing.  Progress strengthening /stabilization /functional activity  Hip and pelvis stability.  SL hip AROM.         Manual Therapy:    5     mins  09522;  Therapeutic Exercise:    25     mins  61830;     Neuromuscular Lor:         mins  87390;    Therapeutic Activity:     10     mins  66884;     Gait Training:            mins  25835;     Ultrasound:           mins  33992;    Electrical Stimulation:     15     mins  75038 ( );  Dry Needling           mins self-pay    Timed Treatment:   40   mins   Total Treatment:     67   mins    Arlene Silverman, PT  Physical Therapist

## 2018-08-17 ENCOUNTER — TREATMENT (OUTPATIENT)
Dept: PHYSICAL THERAPY | Facility: CLINIC | Age: 73
End: 2018-08-17

## 2018-08-17 DIAGNOSIS — Z98.890 STATUS POST LUMBAR DISCECTOMY: Primary | ICD-10-CM

## 2018-08-17 PROCEDURE — 97110 THERAPEUTIC EXERCISES: CPT | Performed by: PHYSICAL THERAPIST

## 2018-08-17 PROCEDURE — G0283 ELEC STIM OTHER THAN WOUND: HCPCS | Performed by: PHYSICAL THERAPIST

## 2018-08-17 PROCEDURE — 97140 MANUAL THERAPY 1/> REGIONS: CPT | Performed by: PHYSICAL THERAPIST

## 2018-08-17 NOTE — PROGRESS NOTES
Physical Therapy Daily Progress Note    Time In 12:55  Time Out 2:00    Visit # : 9  Giselle Bundy reports: woke up without soreness today.  I really like the new exercises on the ball.    Subjective     Objective   See Exercise, Manual, and Modality Logs for complete treatment.   Improved quadratus lumborum muscle recruitment.    No tenderness at piriformis or lumbar paraspinals.  No edema noted at incision.    Assessment/Plan  Tolerated new exercises without pain.  Much improved hip hiking today even able to perform in standing.  Less trendelenburg with gait.  Progress strengthening /stabilization /functional activity  Assess addition of new ex, give pics as patient becomes independent with strength.         Manual Therapy:    8     mins  76982;  Therapeutic Exercise:    30     mins  39626;     Neuromuscular Lor:         mins  28610;    Therapeutic Activity:           mins  90456;     Gait Training:            mins  57325;     Ultrasound:           mins  37173;    Electrical Stimulation:    15     mins  46373 ( );  Dry Needling           mins self-pay    Timed Treatment:   38   mins   Total Treatment:     65   mins    Arlene Silverman, PT  Physical Therapist

## 2018-08-22 ENCOUNTER — TREATMENT (OUTPATIENT)
Dept: PHYSICAL THERAPY | Facility: CLINIC | Age: 73
End: 2018-08-22

## 2018-08-22 DIAGNOSIS — Z98.890 STATUS POST LUMBAR DISCECTOMY: Primary | ICD-10-CM

## 2018-08-22 PROCEDURE — G8979 MOBILITY GOAL STATUS: HCPCS | Performed by: PHYSICAL THERAPIST

## 2018-08-22 PROCEDURE — G8978 MOBILITY CURRENT STATUS: HCPCS | Performed by: PHYSICAL THERAPIST

## 2018-08-22 PROCEDURE — 97112 NEUROMUSCULAR REEDUCATION: CPT | Performed by: PHYSICAL THERAPIST

## 2018-08-22 PROCEDURE — 97530 THERAPEUTIC ACTIVITIES: CPT | Performed by: PHYSICAL THERAPIST

## 2018-08-22 PROCEDURE — 97110 THERAPEUTIC EXERCISES: CPT | Performed by: PHYSICAL THERAPIST

## 2018-08-22 PROCEDURE — G0283 ELEC STIM OTHER THAN WOUND: HCPCS | Performed by: PHYSICAL THERAPIST

## 2018-08-22 NOTE — PROGRESS NOTES
Subjective   Patient ID: Giselle Bundy is a 72 y.o. female is here today for follow-up.  She is 10 weeks out from a L4-5 Metrix decompression by Dr. Craft 6/15/18. Patient is currently taking Neurontin 300 mg TID and Excedrin 250-250-65 mg once a day. Patient is sore from PT. On a scale of 0-10, she rates her pain a 3.    Back Pain   The pain is at a severity of 3/10. The pain is mild. Pertinent negatives include no chest pain.       The following portions of the patient's history were reviewed and updated as appropriate: allergies, current medications, past family history, past medical history, past social history, past surgical history and problem list.    Review of Systems   Respiratory: Negative for chest tightness and shortness of breath.    Cardiovascular: Negative for chest pain.   Musculoskeletal: Positive for back pain.   All other systems reviewed and are negative.      Objective   Physical Exam   Neurological:   Incision ok       Neurologic Exam    Assessment/Plan   Independent Review of Radiographic Studies:      Medical Decision Making:    Ms. Bundy is 10 weeks out from an L4-L5 Metrix decompression.  She continues to have very mild low back pain as well as mild intermittent left hip pain.  Overall her symptoms are much better than they were before surgery and she no longer has any significant leg pain.  She is going to physical therapy and feels that it is helping with her overall strength and stamina.  She has had chronic issues with neck pain in the past and has noticed a little bit of neck discomfort over the last few weeks.  She is going to continue with physical therapy and I suggested that they work on her neck as well.  Certainly if the neck pain fails to improve or gets worse or she experiences any cervical radiculopathy a dedicated cervical MRI can be ordered.  Otherwise at this point she will continue to slowly lessen her restrictions as tolerated.  She will call if she experiences  any increased pain or increased sensory changes or weakness.  Giselle was seen today for post-op.    Diagnoses and all orders for this visit:    Surgery follow-up examination  -     Ambulatory Referral to Physical Therapy Evaluate and treat (2-3 times per week for 8wks. C and L spine. )      Return if symptoms worsen or fail to improve.

## 2018-08-22 NOTE — PROGRESS NOTES
Physical Therapy Daily Progress Note    Time In 130  Time Out 2:40    Visit # : 10  Giselle Bundy reports: no back pain. Occasional hip pain after therapy. I've been feeling nauseous for several weeks.  Finding it hard to do therapy at home because I just haven't felt like it.     Subjective Evaluation    Pain  Current pain ratin           Objective       Palpation     Additional Palpation Details  No tenderness    Neurological Testing     Sensation     Lumbar   Left   Intact: light touch    Right   Intact: light touch    Strength/Myotome Testing     Left Hip   Planes of Motion   Flexion: 5    Right Hip   Planes of Motion   Flexion: 5    Left Knee   Flexion: 5  Extension: 5    Right Knee   Flexion: 5  Extension: 5    Left Ankle/Foot   Dorsiflexion: 5 (5-/5)  Left ankle plantar flexion strength: 5-/5.  Left ankle eversion strength: 5-/5.    Right Ankle/Foot   Dorsiflexion: 5  Plantar flexion: 5     See Exercise, Manual, and Modality Logs for complete treatment.       Assessment/Plan  Improved strength, no back pain.  Mild hip pain with increased activity.  Less trendelenburg noted with closed chain activity. More segmental control of pelvis and hip with neuromuscular control exercises.  Progress per Plan of Care  Add step up and overs  MD after next visit         Manual Therapy:    5     mins  92898;  Therapeutic Exercise:    35     mins  11174;     Neuromuscular Lor:        mins  68577;    Therapeutic Activity:     15      mins  34063;     Gait Training:            mins  53103;     Ultrasound:           mins  08898;    Electrical Stimulation:    15     mins  02035 ( );  Dry Needling           mins self-pay    Timed Treatment:   55   mins   Total Treatment:     70   mins    Arlene Silverman, PT  Physical Therapist

## 2018-08-24 ENCOUNTER — TREATMENT (OUTPATIENT)
Dept: PHYSICAL THERAPY | Facility: CLINIC | Age: 73
End: 2018-08-24

## 2018-08-24 DIAGNOSIS — Z98.890 STATUS POST LUMBAR DISCECTOMY: Primary | ICD-10-CM

## 2018-08-24 PROCEDURE — G0283 ELEC STIM OTHER THAN WOUND: HCPCS | Performed by: PHYSICAL THERAPIST

## 2018-08-24 PROCEDURE — 97110 THERAPEUTIC EXERCISES: CPT | Performed by: PHYSICAL THERAPIST

## 2018-08-24 PROCEDURE — 97530 THERAPEUTIC ACTIVITIES: CPT | Performed by: PHYSICAL THERAPIST

## 2018-08-24 NOTE — PROGRESS NOTES
Re-Assessment / Re-Certification    Time In ***     Time Out ***    Patient: Giselle Bundy   : 1945  Diagnosis/ICD-10 Code:  Status post lumbar discectomy [Z98.890]  Referring practitioner: Yazmin Nj PA-C  Date of Initial Visit: 2018  Today's Date: 2018  Patient seen for 11 sessions      Subjective:   Giselle Bundy reports: ***  Subjective Questionnaire: {PT subjective questionnaires:67425}  Clinical Progress: {UNCHANGED, IMPROVED, WORSE:55112}  Home Program Compliance: {YES/NA/NO:42643}  Treatment has included: {PT interventions:98423}    Subjective   Objective   Assessment/Plan  Progress toward previous goals: {all/partially/not met:44096}    New Goals  Short-term goals (STG): ***  Long-term goals (LTG): ***      Recommendations: {Reassess plan:24386}  Timeframe: { timeframe:2783279887}  Prognosis to achieve goals: {GOOD/FAIR/POOR:07714}    PT Signature: Arlene Silverman, PT      Based upon review of the patient's progress and continued therapy plan, it is my medical opinion that Giselle Bundy should continue physical therapy treatment at [facility]  [address] [place] [id number] [phone].    Signature: __________________________________  Yazmin Nj PA-C    Manual Therapy:    ***     mins  92080;  Therapeutic Exercise:    ***     mins  47028;     Neuromuscular Lor:    ***    mins  15239;    Therapeutic Activity:     ***     mins  74616;     Gait Training:      ***     mins  73584;     Ultrasound:     ***     mins  45029;    Electrical Stimulation:    ***     mins  45193 ( );  Dry Needling     ***     mins self-pay    Timed Treatment:   ***   mins   Total Treatment:     ***   mins

## 2018-08-27 ENCOUNTER — OFFICE VISIT (OUTPATIENT)
Dept: NEUROSURGERY | Facility: CLINIC | Age: 73
End: 2018-08-27

## 2018-08-27 VITALS
SYSTOLIC BLOOD PRESSURE: 140 MMHG | BODY MASS INDEX: 19.31 KG/M2 | HEART RATE: 97 BPM | HEIGHT: 63 IN | DIASTOLIC BLOOD PRESSURE: 91 MMHG | WEIGHT: 109 LBS

## 2018-08-27 DIAGNOSIS — Z09 SURGERY FOLLOW-UP EXAMINATION: Primary | ICD-10-CM

## 2018-08-27 PROCEDURE — 99024 POSTOP FOLLOW-UP VISIT: CPT | Performed by: PHYSICIAN ASSISTANT

## 2018-08-27 RX ORDER — ONDANSETRON 4 MG/1
4 TABLET, FILM COATED ORAL
COMMUNITY
Start: 2018-08-23 | End: 2018-09-07

## 2018-08-27 RX ORDER — OMEPRAZOLE 20 MG/1
CAPSULE, DELAYED RELEASE ORAL
COMMUNITY
Start: 2018-08-23 | End: 2018-09-19

## 2018-09-05 ENCOUNTER — TREATMENT (OUTPATIENT)
Dept: PHYSICAL THERAPY | Facility: CLINIC | Age: 73
End: 2018-09-05

## 2018-09-05 DIAGNOSIS — Z98.890 STATUS POST LUMBAR DISCECTOMY: Primary | ICD-10-CM

## 2018-09-05 PROCEDURE — 97110 THERAPEUTIC EXERCISES: CPT | Performed by: PHYSICAL THERAPIST

## 2018-09-05 PROCEDURE — G0283 ELEC STIM OTHER THAN WOUND: HCPCS | Performed by: PHYSICAL THERAPIST

## 2018-09-05 PROCEDURE — 97112 NEUROMUSCULAR REEDUCATION: CPT | Performed by: PHYSICAL THERAPIST

## 2018-09-05 NOTE — PROGRESS NOTES
Physical Therapy Daily Progress Note     Visit # : 12  Giselle Bundy reports: I was very sick the last week.  My back is sore because I had a GI illness.  Some hip pain as well.  Walked the first time last night.    Subjective     Objective   See Exercise, Manual, and Modality Logs for complete treatment.   Left Hip ER 4-/5, IR 4+/5  SLR (-)    Assessment/Plan  Weakness in left hip external rotators and hip abductors cause patient to adduct leg with attempting to extend her hip or with ambulation.    Progress strengthening /stabilization /functional activity  Resume manual if hip strength improve, possibly pnf for hip in supine         Manual Therapy:          mins  05468;  Therapeutic Exercise:    25     mins  47311;     Neuromuscular Lor:    13    mins  00237;    Therapeutic Activity:           mins  71432;     Gait Training:            mins  66423;     Ultrasound:           mins  75950;    Electrical Stimulation:    15     mins  94411 ( );  Dry Needling           mins self-pay    Timed Treatment:   38   mins   Total Treatment:     70   mins    Arlene Silverman, PT  Physical Therapist

## 2018-09-07 ENCOUNTER — TREATMENT (OUTPATIENT)
Dept: PHYSICAL THERAPY | Facility: CLINIC | Age: 73
End: 2018-09-07

## 2018-09-07 DIAGNOSIS — Z98.890 STATUS POST LUMBAR DISCECTOMY: Primary | ICD-10-CM

## 2018-09-07 PROCEDURE — 97110 THERAPEUTIC EXERCISES: CPT | Performed by: PHYSICAL THERAPIST

## 2018-09-07 PROCEDURE — 97112 NEUROMUSCULAR REEDUCATION: CPT | Performed by: PHYSICAL THERAPIST

## 2018-09-07 NOTE — PROGRESS NOTES
Subjective   Patient ID: Giselle Bundy is a 73 y.o. female is here today for follow-up. She is 3 months out from having an L4-5 Metrix decompression on 6/15/18 by Dr. Craft.    At the last visit the patient reported very mild low back pain with mild intermittent left hip pain.    Today the patient reports Improvement with the low back pain. She has some discomfort in her lower back after physical therapy.     History of Present Illness    The following portions of the patient's history were reviewed and updated as appropriate: allergies, current medications, past family history, past medical history, past social history, past surgical history and problem list.    Review of Systems   All other systems reviewed and are negative.    It has been about 3 months since her surgery. She seems to have turned a corner. She has also been dealing with a lot of nausea and she was scoped by her gastroenterologist recently and was found to have some ulcers. As far as her back and legs go, she is doing much better. She is continuing to work on therapy. I said she could ride her horse now since 3 months have gone by. Will have her be seen again in 4 months. She continues to use gabapentin prescribed by us and I told her to continue it.       Objective   Physical Exam   Constitutional: She is oriented to person, place, and time. She appears well-developed and well-nourished.   HENT:   Head: Normocephalic and atraumatic.   Eyes: Pupils are equal, round, and reactive to light. Conjunctivae and EOM are normal.   Fundoscopic exam:       The right eye shows no papilledema. The right eye shows venous pulsations.        The left eye shows no papilledema. The left eye shows venous pulsations.   Neck: Carotid bruit is not present.   Neurological: She is oriented to person, place, and time. She has a normal Finger-Nose-Finger Test and a normal Heel to Shin Test. Gait normal.   Reflex Scores:       Tricep reflexes are 2+ on the right  side and 2+ on the left side.       Bicep reflexes are 2+ on the right side and 2+ on the left side.       Brachioradialis reflexes are 2+ on the right side and 2+ on the left side.       Patellar reflexes are 2+ on the right side and 2+ on the left side.       Achilles reflexes are 2+ on the right side and 2+ on the left side.  Psychiatric: Her speech is normal.     Neurologic Exam     Mental Status   Oriented to person, place, and time.   Registration of memory: Good recent and remote memory.   Attention: normal. Concentration: normal.   Speech: speech is normal   Level of consciousness: alert  Knowledge: consistent with education.     Cranial Nerves     CN II   Visual fields full to confrontation.   Visual acuity: normal    CN III, IV, VI   Pupils are equal, round, and reactive to light.  Extraocular motions are normal.     CN V   Facial sensation intact.   Right corneal reflex: normal  Left corneal reflex: normal    CN VII   Facial expression full, symmetric.   Right facial weakness: none  Left facial weakness: none    CN VIII   Hearing: intact    CN IX, X   Palate: symmetric    CN XI   Right sternocleidomastoid strength: normal  Left sternocleidomastoid strength: normal    CN XII   Tongue: not atrophic  Tongue deviation: none    Motor Exam   Muscle bulk: normal  Right arm tone: normal  Left arm tone: normal  Right leg tone: normal  Left leg tone: normal    Strength   Strength 5/5 except as noted.     Sensory Exam   Light touch normal.     Gait, Coordination, and Reflexes     Gait  Gait: normal    Coordination   Finger to nose coordination: normal  Heel to shin coordination: normal    Reflexes   Right brachioradialis: 2+  Left brachioradialis: 2+  Right biceps: 2+  Left biceps: 2+  Right triceps: 2+  Left triceps: 2+  Right patellar: 2+  Left patellar: 2+  Right achilles: 2+  Left achilles: 2+  Right : 2+  Left : 2+      Assessment/Plan   Independent Review of Radiographic Studies:    I reviewed her  postoperative MRI which didn't show any evidence of any pseudomeningocele.  Agree with the report.      Medical Decision Making:    Doing quite well.  We'll have her follow-up with our PA or nurse practitioner about 4 months.  If she stable that point, we can see her on a yearly basis since were still giving her her gabapentin.      Diagnoses and all orders for this visit:    Chronic low back pain, unspecified back pain laterality, with sciatica presence unspecified    DDD (degenerative disc disease), lumbar      Return in about 4 months (around 1/19/2019) for with S or L.

## 2018-09-07 NOTE — PROGRESS NOTES
Physical Therapy Daily Progress Note       Visit # : 13  Giselle Bundy reports: hip is a little sore today so I didn't do any exercises yesterday. I can feel my abdominals being much stronger now.    Subjective     Objective   See Exercise, Manual, and Modality Logs for complete treatment.       Assessment/Plan  Patient able to actively hip hike in side lying and stand on one leg without trendelenburg for 10 seconds.  Progressing with strength exercises.  Progress per Plan of Care  Review all new and give pictures  Add swimmers and palloff press       Manual Therapy:    8     mins  22228;  Therapeutic Exercise:    17     mins  62582;     Neuromuscular Lor:    10    mins  62362;    Therapeutic Activity:           mins  95899;     Gait Training:            mins  11520;     Ultrasound:           mins  48106;    Electrical Stimulation:    15     mins  19468 ( );  Dry Needling           mins self-pay    Timed Treatment:   35   mins   Total Treatment:     65   mins    Arlene Silverman, PT  Physical Therapist

## 2018-09-12 ENCOUNTER — TREATMENT (OUTPATIENT)
Dept: PHYSICAL THERAPY | Facility: CLINIC | Age: 73
End: 2018-09-12

## 2018-09-12 DIAGNOSIS — Z98.890 STATUS POST LUMBAR DISCECTOMY: Primary | ICD-10-CM

## 2018-09-12 PROCEDURE — G0283 ELEC STIM OTHER THAN WOUND: HCPCS | Performed by: PHYSICAL THERAPIST

## 2018-09-12 PROCEDURE — 97110 THERAPEUTIC EXERCISES: CPT | Performed by: PHYSICAL THERAPIST

## 2018-09-12 PROCEDURE — 97112 NEUROMUSCULAR REEDUCATION: CPT | Performed by: PHYSICAL THERAPIST

## 2018-09-12 NOTE — PROGRESS NOTES
Physical Therapy Daily Progress Note    Time In 1:00  Time Out 2:13    Visit # : 14  Giselle Bundy reports: not feeling good, saw gastro, going to do testing. I havent felt like doing much at home.    Subjective     Objective   See Exercise, Manual, and Modality Logs for complete treatment.   Tender deep hip rotators.    Assessment/Plan  Weakness in hip but more controlled with pelvic rotation, hip position.  General health limiting progress due to poor endurance and weakness.  No pain but fatigue in hip rotators and abductors.  Needs continued strengthening as tolerated with GI upset.  Progress strengthening /stabilization /functional activity           Manual Therapy:    8     mins  35585;  Therapeutic Exercise:    30     mins  56555;     Neuromuscular Lor:    10    mins  92528;    Therapeutic Activity:           mins  70233;     Gait Training:            mins  37304;     Ultrasound:           mins  50844;    Electrical Stimulation:    15    mins  76839 ( );  Dry Needling           mins self-pay    Timed Treatment:   48   mins   Total Treatment:     63   mins    Arlene Silverman, PT  Physical Therapist

## 2018-09-14 ENCOUNTER — TREATMENT (OUTPATIENT)
Dept: PHYSICAL THERAPY | Facility: CLINIC | Age: 73
End: 2018-09-14

## 2018-09-14 DIAGNOSIS — Z98.890 STATUS POST LUMBAR DISCECTOMY: Primary | ICD-10-CM

## 2018-09-14 PROCEDURE — 97110 THERAPEUTIC EXERCISES: CPT | Performed by: PHYSICAL THERAPIST

## 2018-09-14 PROCEDURE — G0283 ELEC STIM OTHER THAN WOUND: HCPCS | Performed by: PHYSICAL THERAPIST

## 2018-09-14 PROCEDURE — 97530 THERAPEUTIC ACTIVITIES: CPT | Performed by: PHYSICAL THERAPIST

## 2018-09-19 ENCOUNTER — OFFICE VISIT (OUTPATIENT)
Dept: NEUROSURGERY | Facility: CLINIC | Age: 73
End: 2018-09-19

## 2018-09-19 ENCOUNTER — TREATMENT (OUTPATIENT)
Dept: PHYSICAL THERAPY | Facility: CLINIC | Age: 73
End: 2018-09-19

## 2018-09-19 VITALS
SYSTOLIC BLOOD PRESSURE: 163 MMHG | WEIGHT: 109 LBS | DIASTOLIC BLOOD PRESSURE: 93 MMHG | BODY MASS INDEX: 19.31 KG/M2 | HEART RATE: 80 BPM | HEIGHT: 63 IN

## 2018-09-19 DIAGNOSIS — Z98.890 STATUS POST LUMBAR DISCECTOMY: Primary | ICD-10-CM

## 2018-09-19 DIAGNOSIS — M51.36 DDD (DEGENERATIVE DISC DISEASE), LUMBAR: ICD-10-CM

## 2018-09-19 DIAGNOSIS — G89.29 CHRONIC LOW BACK PAIN, UNSPECIFIED BACK PAIN LATERALITY, WITH SCIATICA PRESENCE UNSPECIFIED: Primary | ICD-10-CM

## 2018-09-19 DIAGNOSIS — M54.5 CHRONIC LOW BACK PAIN, UNSPECIFIED BACK PAIN LATERALITY, WITH SCIATICA PRESENCE UNSPECIFIED: Primary | ICD-10-CM

## 2018-09-19 PROCEDURE — 99213 OFFICE O/P EST LOW 20 MIN: CPT | Performed by: NEUROLOGICAL SURGERY

## 2018-09-19 PROCEDURE — G0283 ELEC STIM OTHER THAN WOUND: HCPCS | Performed by: PHYSICAL THERAPIST

## 2018-09-19 PROCEDURE — 97530 THERAPEUTIC ACTIVITIES: CPT | Performed by: PHYSICAL THERAPIST

## 2018-09-19 PROCEDURE — G8978 MOBILITY CURRENT STATUS: HCPCS | Performed by: PHYSICAL THERAPIST

## 2018-09-19 PROCEDURE — 97110 THERAPEUTIC EXERCISES: CPT | Performed by: PHYSICAL THERAPIST

## 2018-09-19 PROCEDURE — G8979 MOBILITY GOAL STATUS: HCPCS | Performed by: PHYSICAL THERAPIST

## 2018-09-19 RX ORDER — DEXLANSOPRAZOLE 60 MG/1
CAPSULE, DELAYED RELEASE ORAL
COMMUNITY
Start: 2018-09-18 | End: 2019-01-22

## 2018-09-19 NOTE — PROGRESS NOTES
Re-Assessment / Re-Certification         Patient: Giselle Bundy   : 1945  Diagnosis/ICD-10 Code:  Status post lumbar discectomy [Z98.890]  Referring practitioner: Yazmin Nj PA-C  Date of Initial Visit: 2018  Today's Date: 2018  Patient seen for 16 sessions      Subjective:   Giselle Bundy reports: no pain the last two days.  When I exercise I still have mild hip muscle soreness.I have been nauseated for weeks and that limits my function much more than my back and has prevented me from performing my normal ADL's.  Subjective Questionnaire: Oswestry: 28%  Clinical Progress: improved  Home Program Compliance: Yes when she has not felt sick  Treatment has included: therapeutic exercise, neuromuscular re-education, manual therapy, therapeutic activity, gait training, electrical stimulation and cryotherapy    Subjective Evaluation    Pain  Current pain ratin         Objective       Postural Observations    Additional Postural Observation Details  Trendelenburg on left with single leg stance on the right.    Palpation   Left   Tenderness of the piriformis.     Strength/Myotome Testing     Lumbar   Left   Heel walk: normal  Toe walk: normal    Right   Heel walk: normal    Left Hip   Planes of Motion   Flexion: 5  Extension: 4  Abduction: 4+  Adduction: 4-  External rotation: 4+  Internal rotation: 4    Left Knee   Flexion: 5  Extension: 5    Left Ankle/Foot   Left ankle dorsiflexion strength: 5-/5.  Eversion: 5  Great toe extension: 4+    Tests     Lumbar     Left   Negative passive SLR.     Functional Assessment     Single Leg Stance   Left: 24 seconds    General Comments     Spine Comments  Healed incision.     Assessment/Plan   Ms. Bundy has made progress in therapy but has been sick for several weeks and has not been able to perform her home exercises and walking very much.  She has no significant pain except in the hip rotators and abductors where she still demonstrates weakness.  Her  core strength, LE strength, her endurance and posture have improved.  Progress toward previous goals:Met    New Goals  Short-term goals (STG): 1.  Hip strength at least 4+/5  Long-term goals (LTG): 1.  Walking up to 1.5 miles      Recommendations: Continue as planned  Timeframe: 2 weeks  Prognosis to achieve goals: good    PT Signature: Arlene Silverman, PT      Based upon review of the patient's progress and continued therapy plan, it is my medical opinion that Giselle Bundy should [facility]  [address] [facility] [address] [phone].    Signature: __________________________________  Yazmin Nj, CHUCK    Manual Therapy:          mins  53043;  Therapeutic Exercise:    25     mins  52257;     Neuromuscular Lor:    5    mins  30041;    Therapeutic Activity:     10     mins  90475;     Gait Training:            mins  16837;     Ultrasound:           mins  60604;    Electrical Stimulation:     15     mins  06342 ( );  Dry Needling           mins self-pay    Timed Treatment:   40   mins   Total Treatment:     70   mins

## 2018-09-21 ENCOUNTER — TREATMENT (OUTPATIENT)
Dept: PHYSICAL THERAPY | Facility: CLINIC | Age: 73
End: 2018-09-21

## 2018-09-21 DIAGNOSIS — Z98.890 STATUS POST LUMBAR DISCECTOMY: Primary | ICD-10-CM

## 2018-09-21 PROCEDURE — 97110 THERAPEUTIC EXERCISES: CPT | Performed by: PHYSICAL THERAPIST

## 2018-09-21 PROCEDURE — G0283 ELEC STIM OTHER THAN WOUND: HCPCS | Performed by: PHYSICAL THERAPIST

## 2018-09-21 NOTE — PROGRESS NOTES
Physical Therapy Daily Progress Note        Visit # : 17  Giselle Bundy reports: hip hasn't been hurting.  Just muscle soreness all over but mild.  Saw MD, said I may return to riding my horse. I am able to balance now with standing and don't feel like I'm going to fall.    Subjective     Objective   See Exercise, Manual, and Modality Logs for complete treatment.       Assessment/Plan  Increased single leg balance up to 24 seconds on single leg.  Able to perform all exercises with no significant subjective complaints of pain as endurance improves.  Progress strengthening /stabilization /functional activity           Manual Therapy:    5     mins  87291;  Therapeutic Exercise:    25     mins  75312;     Neuromuscular Lor:    5    mins  33787;    Therapeutic Activity:           mins  29925;     Gait Training:            mins  05302;     Ultrasound:           mins  92344;    Electrical Stimulation:    15     mins  55143 ( );  Dry Needling           mins self-pay    Timed Treatment:   35   mins   Total Treatment:     60   mins    Arlene Silverman, PT  Physical Therapist

## 2018-09-26 ENCOUNTER — TREATMENT (OUTPATIENT)
Dept: PHYSICAL THERAPY | Facility: CLINIC | Age: 73
End: 2018-09-26

## 2018-09-26 DIAGNOSIS — Z98.890 STATUS POST LUMBAR DISCECTOMY: Primary | ICD-10-CM

## 2018-09-26 PROCEDURE — G0283 ELEC STIM OTHER THAN WOUND: HCPCS | Performed by: PHYSICAL THERAPIST

## 2018-09-26 PROCEDURE — 97110 THERAPEUTIC EXERCISES: CPT | Performed by: PHYSICAL THERAPIST

## 2018-09-26 NOTE — PROGRESS NOTES
Physical Therapy Daily Progress Note       Visit # : 18  Giselle Bundy reports: shoulders bothering me with kneeling exercises. My hip has been sore again.  Walked once, haven't done a lot more.    Subjective     Objective   See Exercise, Manual, and Modality Logs for complete treatment.       Assessment/Plan  Hip fatigued quickly during exercises today and then became sore.  Endurance still limited with prolonged positioning.  Progress strengthening /stabilization /functional activity  D/c quadraped with weight on hands and alt arms  Continue one more visit this week, then qiw       Manual Therapy:          mins  35784;  Therapeutic Exercise:    30     mins  32304;     Neuromuscular Lor:    5    mins  11678;    Therapeutic Activity:           mins  60039;     Gait Training:            mins  94744;     Ultrasound:           mins  65831;    Electrical Stimulation:    15     mins  17092 ( );  Dry Needling           mins self-pay    Timed Treatment:   35  mins   Total Treatment:     50   mins    Arlene Silverman, PT  Physical Therapist

## 2018-09-28 ENCOUNTER — TREATMENT (OUTPATIENT)
Dept: PHYSICAL THERAPY | Facility: CLINIC | Age: 73
End: 2018-09-28

## 2018-09-28 DIAGNOSIS — Z98.890 STATUS POST LUMBAR DISCECTOMY: Primary | ICD-10-CM

## 2018-09-28 PROCEDURE — 97110 THERAPEUTIC EXERCISES: CPT | Performed by: PHYSICAL THERAPIST

## 2018-09-28 PROCEDURE — G0283 ELEC STIM OTHER THAN WOUND: HCPCS | Performed by: PHYSICAL THERAPIST

## 2018-09-28 PROCEDURE — 97530 THERAPEUTIC ACTIVITIES: CPT | Performed by: PHYSICAL THERAPIST

## 2018-09-28 NOTE — PROGRESS NOTES
Physical Therapy Daily Progress Note       Visit # : 19  Giselle Bundy reports: I was sore on Wednesday.  Did ok with PT but when I got home I pulled weeds from a standing position and kept bending over.    Subjective     Objective   See Exercise, Manual, and Modality Logs for complete treatment.   Pt education:  Body mechanics with lifting, bending, and avoidance of twisting.      Assessment/Plan  Patient fatigued with prolonged bending with gardening.  Needs continued strengthening before doing this much yard work.  Tolerated simulation of horseback riding with engaging LE and core in a squatting positions. Needs to continue strengthening to return to prior levels of ADL's and sports.  Progress per Plan of Care and Progress strengthening /stabilization /functional activity  Two more visits then HEP         Manual Therapy:          mins  52934;  Therapeutic Exercise:    30     mins  82591;     Neuromuscular Lor:   5    mins  94780;    Therapeutic Activity:    8       mins  62819;     Gait Training:            mins  07210;     Ultrasound:           mins  15076;    Electrical Stimulation:    15     mins  22602 ( );  Dry Needling           mins self-pay    Timed Treatment:   43   mins   Total Treatment:     65   mins    Arlene Silverman, PT  Physical Therapist

## 2018-10-03 ENCOUNTER — TREATMENT (OUTPATIENT)
Dept: PHYSICAL THERAPY | Facility: CLINIC | Age: 73
End: 2018-10-03

## 2018-10-03 DIAGNOSIS — Z98.890 STATUS POST LUMBAR DISCECTOMY: Primary | ICD-10-CM

## 2018-10-03 PROCEDURE — 97110 THERAPEUTIC EXERCISES: CPT | Performed by: PHYSICAL THERAPIST

## 2018-10-03 PROCEDURE — 97112 NEUROMUSCULAR REEDUCATION: CPT | Performed by: PHYSICAL THERAPIST

## 2018-10-03 PROCEDURE — G0283 ELEC STIM OTHER THAN WOUND: HCPCS | Performed by: PHYSICAL THERAPIST

## 2018-10-03 NOTE — PROGRESS NOTES
Physical Therapy Daily Progress Note         Visit # : 20  Giselle Bundy reports: my back is sore, I've been pulling a lot of weeds in my garden. No pain, just sore.    Subjective     Objective   See Exercise, Manual, and Modality Logs for complete treatment.   Patient continues to need cueing with exercise form. Less cueing on posture.      Assessment/Plan  Fatigued with prolonged squatting.  Weakness persists with end range hip abduction and extension, requires cueing with exercises as proprioception is still altered.  Progress per Plan of Care  Patient nearly out of insurance, may progress to HEP next visit although will needs cueing with her exercise form.  Show lifting techniques       Manual Therapy:           mins  84247;  Therapeutic Exercise:    25     mins  62427;     Neuromuscular Lor:    15    mins  25794;    Therapeutic Activity:           mins  77119;     Gait Training:            mins  06882;     Ultrasound:           mins  51161;    Electrical Stimulation:    15     mins  87698 ( );  Dry Needling           mins self-pay    Timed Treatment:   40   mins   Total Treatment:     63   mins    Arlene Silverman, PT  Physical Therapist

## 2018-10-10 ENCOUNTER — TREATMENT (OUTPATIENT)
Dept: PHYSICAL THERAPY | Facility: CLINIC | Age: 73
End: 2018-10-10

## 2018-10-10 DIAGNOSIS — Z98.890 STATUS POST LUMBAR DISCECTOMY: Primary | ICD-10-CM

## 2018-10-10 PROCEDURE — 97110 THERAPEUTIC EXERCISES: CPT | Performed by: PHYSICAL THERAPIST

## 2018-10-10 PROCEDURE — G8978 MOBILITY CURRENT STATUS: HCPCS | Performed by: PHYSICAL THERAPIST

## 2018-10-10 PROCEDURE — G0283 ELEC STIM OTHER THAN WOUND: HCPCS | Performed by: PHYSICAL THERAPIST

## 2018-10-10 PROCEDURE — G8979 MOBILITY GOAL STATUS: HCPCS | Performed by: PHYSICAL THERAPIST

## 2018-10-10 PROCEDURE — 97530 THERAPEUTIC ACTIVITIES: CPT | Performed by: PHYSICAL THERAPIST

## 2018-10-10 NOTE — PROGRESS NOTES
Physical Therapy Daily Progress Note         Visit # : 21  Giselle Bundy reports: mild soreness in hip.  Not sure if its muscle or nerve.  Walked last night and it felt really good.      Subjective     Objective       Neurological Testing     Sensation     Lumbar   Left   Intact: light touch    Right   Intact: light touch    Strength/Myotome Testing     Left Hip   Planes of Motion   Flexion: 5  Extension: 4+  Abduction: 4+  External rotation: 4+  Internal rotation: 4+    Left Knee   Flexion: 5  Extension: 5    Left Ankle/Foot   Dorsiflexion: 5  Great toe extension strength: 5-    Tests     Lumbar     Left   Negative passive SLR and valsalva.      See Exercise, Manual, and Modality Logs for complete treatment.       Assessment/Plan  Patient is independent with her home exercise program, has improved strength, and improved independence and tolerance of ADL's.  She needs to continue her strengthening and endurance exercises especially with the hip musculature.  We discussed use of a home TENS unit due to her intolerance of meds with her GI issues.  She may return if she decides she would like to have one for home.  Goals met except walking distance.  Discharge to [facility], TENS unit if needed         Manual Therapy:          mins  32750;  Therapeutic Exercise:    30     mins  87925;     Neuromuscular Lor:    10    mins  12897;    Therapeutic Activity:     10     mins  37501;     Gait Training:            mins  40513;     Ultrasound:           mins  62314;    Electrical Stimulation:    15     mins  73508 ( );  Dry Needling           mins self-pay    Timed Treatment:   50   mins   Total Treatment:     80   mins    Arlene Silverman, PT  Physical Therapist

## 2018-10-10 NOTE — PROGRESS NOTES
Discharge Summary  Discharge Summary from Physical Therapy Report      Dates  PT visit: 7/18/18-10/10/18  Number of Visits: 21     Discharge Status of Patient: See   Note dated 10/10/18    Goals: All Met    Discharge Plan: Continue with current home exercise program as instructed    Comments  Patient may return for TENS unit    Date of Discharge 10/10/18        Arlene Silverman, PT  Physical Therapist

## 2018-10-26 ENCOUNTER — TELEPHONE (OUTPATIENT)
Dept: NEUROSURGERY | Facility: CLINIC | Age: 73
End: 2018-10-26

## 2018-10-26 NOTE — TELEPHONE ENCOUNTER
Pt called today to ask for an rx for a TENs unit form from her physical therapist. I can be faxed to #[phone] attn: Chantel.

## 2018-10-30 RX ORDER — PEN NEEDLE, DIABETIC 32GX 5/32"
1 NEEDLE, DISPOSABLE MISCELLANEOUS 2 TIMES DAILY PRN
Qty: 1 DEVICE | Refills: 0 | Status: SHIPPED | OUTPATIENT
Start: 2018-10-30

## 2018-11-19 ENCOUNTER — TELEPHONE (OUTPATIENT)
Dept: NEUROSURGERY | Facility: CLINIC | Age: 73
End: 2018-11-19

## 2018-11-19 NOTE — TELEPHONE ENCOUNTER
Patient is requesting a refill on Gabapentin 300mg TID to [facility] [phone] patients number is [phone]

## 2018-11-20 RX ORDER — GABAPENTIN 300 MG/1
300 CAPSULE ORAL 3 TIMES DAILY
Qty: 180 CAPSULE | Refills: 3 | Status: CANCELLED | OUTPATIENT
Start: 2018-11-20

## 2018-11-20 RX ORDER — GABAPENTIN 300 MG/1
300 CAPSULE ORAL 3 TIMES DAILY
Qty: 270 CAPSULE | Refills: 4 | Status: SHIPPED | OUTPATIENT
Start: 2018-11-20

## 2018-11-20 RX ORDER — GABAPENTIN 300 MG/1
300 CAPSULE ORAL 3 TIMES DAILY
Qty: 270 CAPSULE | Refills: 4 | Status: SHIPPED | OUTPATIENT
Start: 2018-11-20 | End: 2018-11-20 | Stop reason: SDUPTHER

## 2019-01-03 NOTE — PROGRESS NOTES
Subjective   Patient ID: Giselle Bundy is a 73 y.o. female is here today for follow-up. She is 7 months out from a L4-5 metrix decompression by Dr. Craft 6/15/18. Patient states she is doing well. She does have occasional pain when she increases her activity. Patient denies any leg pain.         Back Pain   This is a chronic problem. The current episode started more than 1 year ago. The problem occurs intermittently. The problem has been gradually improving since onset. The pain is present in the lumbar spine. The pain does not radiate. The pain is at a severity of 2/10. The pain is mild. Associated symptoms include tingling (bilateral feet). Pertinent negatives include no chest pain, leg pain, numbness or weakness.       The following portions of the patient's history were reviewed and updated as appropriate: allergies, current medications, past family history, past medical history, past social history, past surgical history and problem list.    Review of Systems   Respiratory: Negative for chest tightness and shortness of breath.    Cardiovascular: Negative for chest pain.   Genitourinary: Negative for difficulty urinating.   Musculoskeletal: Positive for back pain.   Neurological: Positive for tingling (bilateral feet). Negative for weakness and numbness.   All other systems reviewed and are negative.      Objective   Physical Exam   Constitutional: She is oriented to person, place, and time. She appears well-developed and well-nourished.   HENT:   Head: Normocephalic and atraumatic.   Right Ear: External ear normal.   Left Ear: External ear normal.   Eyes: Conjunctivae and EOM are normal. Pupils are equal, round, and reactive to light. Right eye exhibits no discharge. Left eye exhibits no discharge.   Neck: Normal range of motion. Neck supple. No tracheal deviation present.   Pulmonary/Chest: Effort normal. No stridor. No respiratory distress.   Musculoskeletal: Normal range of motion. She exhibits no  edema, tenderness or deformity.   Neurological: She is alert and oriented to person, place, and time. She has normal strength and normal reflexes. She displays no atrophy, no tremor and normal reflexes. No cranial nerve deficit or sensory deficit. She exhibits normal muscle tone. She displays a negative Romberg sign. She displays no seizure activity. Coordination and gait normal.   No long tract signs   Skin: Skin is warm and dry.   Psychiatric: She has a normal mood and affect. Her behavior is normal. Judgment and thought content normal.   Nursing note and vitals reviewed.    Neurologic Exam     Mental Status   Oriented to person, place, and time.     Cranial Nerves     CN III, IV, VI   Pupils are equal, round, and reactive to light.  Extraocular motions are normal.     Motor Exam     Strength   Strength 5/5 throughout.       Assessment/Plan   Independent Review of Radiographic Studies:      Medical Decision Making:    Ms. Bundy is 7 1/2 months out from a L L4-5 Metrix decompression.  She is done extremely well and no longer has any nerve pain at all.  She has mild back pain at times that is described as a soreness.  She is very pleased with her recovery.  She did complete a prolonged course of therapy before the end of the year but got an upper respiratory infection in late December and spent 3 or 4 weeks in bed.  She would like to go back just to get a jumpstart on getting back into her usual program.  I will give her a new prescription for therapy.  She will call as needed.  Giselle was seen today for follow-up.    Diagnoses and all orders for this visit:    DDD (degenerative disc disease), lumbar  -     Ambulatory Referral to Physical Therapy Evaluate and treat (2-3 times per week for 4wks)      Return if symptoms worsen or fail to improve.

## 2019-01-22 ENCOUNTER — OFFICE VISIT (OUTPATIENT)
Dept: NEUROSURGERY | Facility: CLINIC | Age: 74
End: 2019-01-22

## 2019-01-22 VITALS
DIASTOLIC BLOOD PRESSURE: 83 MMHG | WEIGHT: 105 LBS | HEART RATE: 87 BPM | HEIGHT: 63 IN | SYSTOLIC BLOOD PRESSURE: 144 MMHG | BODY MASS INDEX: 18.61 KG/M2

## 2019-01-22 DIAGNOSIS — M51.36 DDD (DEGENERATIVE DISC DISEASE), LUMBAR: Primary | ICD-10-CM

## 2019-01-22 PROCEDURE — 99212 OFFICE O/P EST SF 10 MIN: CPT | Performed by: PHYSICIAN ASSISTANT

## 2019-02-19 ENCOUNTER — TRANSCRIBE ORDERS (OUTPATIENT)
Dept: ADMINISTRATIVE | Facility: HOSPITAL | Age: 74
End: 2019-02-19

## 2019-02-19 ENCOUNTER — HOSPITAL ENCOUNTER (OUTPATIENT)
Dept: GENERAL RADIOLOGY | Facility: HOSPITAL | Age: 74
Discharge: HOME | End: 2019-02-19
Admitting: NURSE PRACTITIONER

## 2019-02-19 DIAGNOSIS — M25.562 CHRONIC PAIN OF LEFT KNEE: Primary | ICD-10-CM

## 2019-02-19 DIAGNOSIS — M25.562 CHRONIC PAIN OF LEFT KNEE: ICD-10-CM

## 2019-02-19 DIAGNOSIS — G89.29 CHRONIC PAIN OF LEFT KNEE: ICD-10-CM

## 2019-02-19 DIAGNOSIS — G89.29 CHRONIC PAIN OF LEFT KNEE: Primary | ICD-10-CM

## 2019-02-19 PROCEDURE — 73560 X-RAY EXAM OF KNEE 1 OR 2: CPT

## 2019-03-20 ENCOUNTER — TELEPHONE (OUTPATIENT)
Dept: NEUROSURGERY | Facility: CLINIC | Age: 74
End: 2019-03-20

## 2019-03-20 NOTE — TELEPHONE ENCOUNTER
Patient called and wants a new order for physical therapy. She saw Yazmin on 2019 and she ordered physical therapy at that visit. She had a fall shortly after her visit with Yazmin and she hurt her knee and was unable to attend any therapy's. Her PT order is now  and she is wanting a new order. She states her knee is now fine.

## 2019-03-21 DIAGNOSIS — M51.36 DDD (DEGENERATIVE DISC DISEASE), LUMBAR: Primary | ICD-10-CM

## 2019-03-21 NOTE — TELEPHONE ENCOUNTER
Called left vm letting pt know new order has been put in Epic however if she wants it faxed to a facility outside of [facility] she will need to call us back with that information.

## 2019-04-06 NOTE — PROGRESS NOTES
Physical Therapy Initial Evaluation and Plan of Care         Patient: Giselle Bundy   : 1945  Diagnosis/ICD-10 Code:  DDD (degenerative disc disease), lumbar [M51.36]  Referring practitioner: Yazmin Nj PA-C  Date of Initial Visit: 2019  Today's Date: 2019  Patient seen for 22 sessions           Subjective Questionnaire: Oswestry: 22%      Subjective Evaluation    History of Present Illness  Date of surgery: 6/15/2018  Mechanism of injury: Lumbar L4,5 decompression last year.  Completed course of therapy.  Was ill for several weeks and feel like lost a lot of strength. Went to gym.   fell in basement and bruised knee.  Stopped doing exercises.   Returns for more strengthening.    Subjective comment: Occasional hip pain, still takes Gabapentin. Notes pain in my back muscles in the morning.  Occasional sensation that radiates to ankle but nothing severe. Rode horse in fall a couple of times but has not returned to regular riding.  Patient Occupation: Retired   Precautions and Work Restrictions: NonePain  Current pain ratin  Location: Occasional hip pain left.  Bilatateral lumbar paraspinals.  Quality: dull ache  Relieving factors: rest  Aggravating factors: prolonged positioning (gardening)  Progression: improved    Social Support  Lives in: multiple-level home  Lives with: spouse    Diagnostic Tests  X-ray: normal    Treatments  Previous treatment: physical therapy and medication  Patient Goals  Patient goals for therapy: return to sport/leisure activities and independence with ADLs/IADLs  Patient goal: Back to horse riding up to 3 times a week, yardwork without back pain.           Objective     Special Questions      Additional Special Questions  Unremarkable.       Postural Observations    Additional Postural Observation Details  Left lumbar curve    Palpation   Left   Tenderness of the erector spinae.     Right Tenderness of the erector spinae.     Additional Palpation  Details  Left piriformis    Tenderness     Left Hip   Tenderness in the PSIS.     Neurological Testing     Sensation     Lumbar   Left   Intact: light touch    Right   Intact: light touch    Reflexes   Left   Patellar (L4): normal (2+)  Achilles (S1): normal (2+)    Right   Patellar (L4): normal (2+)  Achilles (S1): normal (2+)    Active Range of Motion     Lumbar   Flexion: 90 degrees   Extension: 32 degrees   Left lateral flexion: 24 degrees   Right lateral flexion: 29 degrees     Strength/Myotome Testing     Lumbar   Left   Heel walk: normal  Toe walk: normal    Right   Heel walk: normal  Toe walk: normal    Left Hip   Planes of Motion   Flexion: 5  Extension: 4  Abduction: 4+  Adduction: 4  External rotation: 4- (40)  Internal rotation: 4- (40)    Right Hip   Planes of Motion   Flexion: 4+  Extension: 4+  Abduction: 4-  Adduction: 4+  External rotation: 4 (35)  Right hip internal rotation strength: 34.    Left Knee   Flexion: 4  Extension: 5    Right Knee   Flexion: 4  Extension: 5    Left Ankle/Foot   Dorsiflexion: 4+  Plantar flexion: 4+  Inversion: 4  Eversion: 4+  Great toe extension: 4+    Right Ankle/Foot   Dorsiflexion: 5  Plantar flexion: 5  Inversion: 5  Eversion: 5  Great toe extension: 5    Tests     Lumbar     Left   Negative passive SLR and valsalva.     Right   Negative passive SLR and valsalva.     Additional Tests Details  Decreased tandem balance: left LE 15 seconds, right 1 minute.  Bridge test left (+)         Assessment & Plan     Assessment  Impairments: abnormal muscle tone, abnormal or restricted ROM, activity intolerance, impaired balance, impaired physical strength, lacks appropriate home exercise program and pain with function  Assessment details: Patient is a 72 yo female with gradual onset of weakness in the LE and lumbar region with mild increasing low back pain with prolonged standing and ADL's.  She has occasional pain at the left hip and into the bilateral lumbar paraspinals.  She  reports no giving way or instability in the joints.  She denies any radicular s/s except mild tingling in the left foot.  Patient lost her balance while ambulating into the clinic.  On exam the patient demonstrates weakness in the LE and core, poor posture, with full ROM and flexibility.  Special tests are negative.  Neuro exam appears normal.  Based on the  the above findings, she is an excellent candidate for therapy to regain strength, endurance, and balance to continue to perform her ADL's and remain home safely.    Prognosis: good  Functional Limitations: carrying objects, lifting, sleeping, walking, pulling, pushing, uncomfortable because of pain, moving in bed and standing  Goals  Plan Goals: 3 weeks. Pt will:  1. Tolerate initial HEP  2. Perform advanced TrA retraining exercises with lumbar stability.  3. Increase left hip strength 1/2 grade.            6 weeks. Pt will:  1. Report pain of </=1/10 with all ADLs  2. Demonstrate safe gait, transfers, and ADL's without loss of balance  3. Oswestry </= 15%  4. Demonstrate a negative bridge test for core weakness.      Plan  Therapy options: will be seen for skilled physical therapy services  Planned modality interventions: cryotherapy, thermotherapy (hydrocollator packs) and electrical stimulation/Russian stimulation  Planned therapy interventions: abdominal trunk stabilization, manual therapy, ADL retraining, neuromuscular re-education, balance/weight-bearing training, body mechanics training, postural training, flexibility, functional ROM exercises, strengthening, stretching, transfer training and therapeutic activities  Frequency: 2x week  Duration in weeks: 6  Treatment plan discussed with: patient        Manual Therapy:    -     mins  63864;  Therapeutic Exercise:    30     mins  21020;     Neuromuscular Lor:    -    mins  12656;    Therapeutic Activity:     -     mins  61740;     Gait Training:      -     mins  33723;     Ultrasound:     -     mins   24035;    Electrical Stimulation:    -     mins  56480 ( );  Dry Needling     -     mins self-pay    Timed Treatment:   30   mins   Total Treatment:     65   mins    PT SIGNATURE: Arlene Silverman, PT   DATE TREATMENT INITIATED: 4/8/2019    Initial Certification  Certification Period: 7/7/2019  I certify that the therapy services are furnished while this patient is under my care.  The services outlined above are required by this patient, and will be reviewed every 90 days.     PHYSICIAN: Yazmin Nj PA-C      DATE:     Please sign and return via fax to [phone].. Thank you, [facility].

## 2019-04-08 ENCOUNTER — TREATMENT (OUTPATIENT)
Dept: PHYSICAL THERAPY | Facility: CLINIC | Age: 74
End: 2019-04-08

## 2019-04-08 DIAGNOSIS — R29.898 WEAKNESS OF BOTH LOWER EXTREMITIES: ICD-10-CM

## 2019-04-08 DIAGNOSIS — M54.5 CHRONIC LOW BACK PAIN, UNSPECIFIED BACK PAIN LATERALITY, WITH SCIATICA PRESENCE UNSPECIFIED: ICD-10-CM

## 2019-04-08 DIAGNOSIS — M51.36 DDD (DEGENERATIVE DISC DISEASE), LUMBAR: Primary | ICD-10-CM

## 2019-04-08 DIAGNOSIS — R26.89 BALANCE PROBLEM: ICD-10-CM

## 2019-04-08 DIAGNOSIS — G89.29 CHRONIC LOW BACK PAIN, UNSPECIFIED BACK PAIN LATERALITY, WITH SCIATICA PRESENCE UNSPECIFIED: ICD-10-CM

## 2019-04-08 PROCEDURE — 97110 THERAPEUTIC EXERCISES: CPT | Performed by: PHYSICAL THERAPIST

## 2019-04-08 PROCEDURE — 97161 PT EVAL LOW COMPLEX 20 MIN: CPT | Performed by: PHYSICAL THERAPIST

## 2019-04-19 ENCOUNTER — TREATMENT (OUTPATIENT)
Dept: PHYSICAL THERAPY | Facility: CLINIC | Age: 74
End: 2019-04-19

## 2019-04-19 DIAGNOSIS — M51.36 DDD (DEGENERATIVE DISC DISEASE), LUMBAR: Primary | ICD-10-CM

## 2019-04-19 DIAGNOSIS — G89.29 CHRONIC LOW BACK PAIN WITHOUT SCIATICA, UNSPECIFIED BACK PAIN LATERALITY: ICD-10-CM

## 2019-04-19 DIAGNOSIS — R29.898 WEAKNESS OF BOTH LOWER EXTREMITIES: ICD-10-CM

## 2019-04-19 DIAGNOSIS — M54.50 CHRONIC LOW BACK PAIN WITHOUT SCIATICA, UNSPECIFIED BACK PAIN LATERALITY: ICD-10-CM

## 2019-04-19 PROCEDURE — 97110 THERAPEUTIC EXERCISES: CPT | Performed by: PHYSICAL THERAPIST

## 2019-04-19 PROCEDURE — 97112 NEUROMUSCULAR REEDUCATION: CPT | Performed by: PHYSICAL THERAPIST

## 2019-04-19 NOTE — PROGRESS NOTES
Physical Therapy Daily Progress Note       Visit # : 23  Giselle Bundy reports: stiffness everywhere this week.    I haven't felt like doing my exercises but I did work in the garden.    Subjective     Objective   See Exercise, Manual, and Modality Logs for complete treatment.   No tenderness noted at lumbar spine or paraspinals.    Assessment/Plan  Advanced stabilization and strengthening without increased pain. Patient encouraged to continue HEP as well as therapy for improved strengthening.  Progress per Plan of Care           Manual Therapy:    -     mins  92214;  Therapeutic Exercise:    30     mins  14599;     Neuromuscular Lor:    9    mins  54729;    Therapeutic Activity:     -     mins  01924;     Gait Training:      -     mins  94436;     Ultrasound:     -     mins  53237;    Electrical Stimulation:    -     mins  39979 ( );  Dry Needling     -     mins self-pay    Timed Treatment:   39   mins   Total Treatment:     65   mins    Arlene Silverman, PT  Physical Therapist

## 2019-04-24 ENCOUNTER — TREATMENT (OUTPATIENT)
Dept: PHYSICAL THERAPY | Facility: CLINIC | Age: 74
End: 2019-04-24

## 2019-04-24 DIAGNOSIS — R29.898 WEAKNESS OF BOTH LOWER EXTREMITIES: ICD-10-CM

## 2019-04-24 DIAGNOSIS — M51.36 DDD (DEGENERATIVE DISC DISEASE), LUMBAR: Primary | ICD-10-CM

## 2019-04-24 PROCEDURE — 97530 THERAPEUTIC ACTIVITIES: CPT | Performed by: PHYSICAL THERAPIST

## 2019-04-24 PROCEDURE — 97112 NEUROMUSCULAR REEDUCATION: CPT | Performed by: PHYSICAL THERAPIST

## 2019-04-24 PROCEDURE — 97110 THERAPEUTIC EXERCISES: CPT | Performed by: PHYSICAL THERAPIST

## 2019-04-24 NOTE — PROGRESS NOTES
Physical Therapy Daily Progress Note         Visit # : 24  Giselle Bundy reports: back is stiff today.  I've been working in the yard.  Have done my exercises as well.    Subjective     Objective   See Exercise, Manual, and Modality Logs for complete treatment.   Trendelenburg on left hip with walking forward and laterally.    Assessment/Plan  Fatigued with exercises but no pain or alteration in normal movement patterns.  Compliant with HEP this week.  Continued encouragement for need to complete HEP.  Progress strengthening /stabilization /functional activity           Manual Therapy:    -     mins  89651;  Therapeutic Exercise:    30     mins  66275;     Neuromuscular Lor:    15    mins  84559;    Therapeutic Activity:     10     mins  20404;     Gait Training:      -     mins  83095;     Ultrasound:     -     mins  94802;    Electrical Stimulation:    -     mins  15958 ( );  Dry Needling     -     mins self-pay    Timed Treatment:   55   mins   Total Treatment:     75   mins    Arlene Silverman, PT  Physical Therapist

## 2019-04-29 ENCOUNTER — TREATMENT (OUTPATIENT)
Dept: PHYSICAL THERAPY | Facility: CLINIC | Age: 74
End: 2019-04-29

## 2019-04-29 DIAGNOSIS — G89.29 CHRONIC LOW BACK PAIN WITHOUT SCIATICA, UNSPECIFIED BACK PAIN LATERALITY: ICD-10-CM

## 2019-04-29 DIAGNOSIS — R29.898 WEAKNESS OF BOTH LOWER EXTREMITIES: ICD-10-CM

## 2019-04-29 DIAGNOSIS — M54.50 CHRONIC LOW BACK PAIN WITHOUT SCIATICA, UNSPECIFIED BACK PAIN LATERALITY: ICD-10-CM

## 2019-04-29 DIAGNOSIS — M51.36 DDD (DEGENERATIVE DISC DISEASE), LUMBAR: Primary | ICD-10-CM

## 2019-04-29 PROCEDURE — 97110 THERAPEUTIC EXERCISES: CPT | Performed by: PHYSICAL THERAPIST

## 2019-04-29 PROCEDURE — 97112 NEUROMUSCULAR REEDUCATION: CPT | Performed by: PHYSICAL THERAPIST

## 2019-04-29 NOTE — PROGRESS NOTES
Physical Therapy Daily Progress Note         Visit # : 25  Giselle Bundy reports: I'm having less back pain now.  Able to work in yard without pain the last few days.    Subjective     Objective   See Exercise, Manual, and Modality Logs for complete treatment.       Assessment/Plan  When patient has fatigue, hip hikes, left hip rotates into ER and patient leans laterally.  Needs continued strengthening. Improved quadratus control with use of hip hike exercise.  Progress per Plan of Care           Manual Therapy:    -0     mins  84833;  Therapeutic Exercise:    30     mins  91097;     Neuromuscular Lor:    10    mins  19449;    Therapeutic Activity:     -     mins  83342;     Gait Training:      -     mins  80847;     Ultrasound:     -     mins  91993;    Electrical Stimulation:    -     mins  81339 ( );  Dry Needling     -     mins self-pay    Timed Treatment:    40   mins   Total Treatment:     60   mins    Arlene Silverman, PT  Physical Therapist

## 2019-05-01 ENCOUNTER — TREATMENT (OUTPATIENT)
Dept: PHYSICAL THERAPY | Facility: CLINIC | Age: 74
End: 2019-05-01

## 2019-05-01 DIAGNOSIS — M54.50 CHRONIC LOW BACK PAIN WITHOUT SCIATICA, UNSPECIFIED BACK PAIN LATERALITY: ICD-10-CM

## 2019-05-01 DIAGNOSIS — M51.36 DDD (DEGENERATIVE DISC DISEASE), LUMBAR: Primary | ICD-10-CM

## 2019-05-01 DIAGNOSIS — R29.898 WEAKNESS OF BOTH LOWER EXTREMITIES: ICD-10-CM

## 2019-05-01 DIAGNOSIS — G89.29 CHRONIC LOW BACK PAIN WITHOUT SCIATICA, UNSPECIFIED BACK PAIN LATERALITY: ICD-10-CM

## 2019-05-01 PROCEDURE — 97530 THERAPEUTIC ACTIVITIES: CPT | Performed by: PHYSICAL THERAPIST

## 2019-05-01 PROCEDURE — 97110 THERAPEUTIC EXERCISES: CPT | Performed by: PHYSICAL THERAPIST

## 2019-05-01 NOTE — PROGRESS NOTES
Physical Therapy Daily Progress Note       Visit # : 26  Giselle Bundy reports: sore when I work in the garden.  Wake up with no pain, I feel great. I feel like I am able to walk straighter.  Ready to go back to the gym.    Subjective     Objective   See Exercise, Manual, and Modality Logs for complete treatment.   (-) Bridge and SLR test  Balance 30 seconds  Left hip flexion, abduction, adduction 5/5, extension 4+/5, IR 4+/5, ER 5/5  Left knee 5/5, DF, eversion, EHL 5/5.    Assessment/Plan  Patient presents with normal left LE strength with slight weakness remaining in hip IR and extension.  All testing is normal.  Patient is independent with her HEP and ready to continue on her own.  All goals met.  Other D/C to HEP unless any problems.  She will call if she needs to return.           Manual Therapy:    -     mins  55433;  Therapeutic Exercise:    45     mins  68790;     Neuromuscular Lor:    5    mins  15979;    Therapeutic Activity:     15     mins  73392;     Gait Training:      -     mins  74859;     Ultrasound:     -     mins  00214;    Electrical Stimulation:    -     mins  04366 ( );  Dry Needling     -     mins self-pay    Timed Treatment:   65   mins   Total Treatment:     81   mins    Arlene Silverman, PT  Physical Therapist

## 2019-07-29 ENCOUNTER — DOCUMENTATION (OUTPATIENT)
Dept: PHYSICAL THERAPY | Facility: CLINIC | Age: 74
End: 2019-07-29

## 2019-07-29 NOTE — PROGRESS NOTES
Discharge Summary  Discharge Summary from Physical Therapy Report      Dates  PT visit: 4/8/19-5/1/19  Number of Visits: 26     Discharge Status of Patient: Independent with HEP    Goals: All Met    Discharge Plan: Continue with current home exercise program as instructed    Comments      Date of Discharge 5/1/19        Arlene Silverman, PT  Physical Therapist

## 2019-10-10 ENCOUNTER — TELEPHONE (OUTPATIENT)
Dept: NEUROSURGERY | Facility: CLINIC | Age: 74
End: 2019-10-10

## 2019-10-10 NOTE — TELEPHONE ENCOUNTER
"Spoke with patient and informed her that she would have to be seen if she wanted Gabapentin.  She was last seen in January 2019 and was released to prn status.    Gabapentin is considered a scheduled drug and we cannot prescribe it without her being seen.    Patient states that she just wants it because she has been in the hospital recently for another problem and cannot do her exercises and does not want her nerve pain \" to return\", she is not currently having any problems.    Patient said that she would contact her PCP and get him to write the script  "

## 2019-10-10 NOTE — TELEPHONE ENCOUNTER
Patient called and wanted to know if she could get a prescription for gabapentin and I let her know that Dr. Craft is out of the office for 2 weeks.  She wanted to know if Yazmin could fill it for her.  She would like the prescription called into [facility].

## (undated) DEVICE — APPL CHLORAPREP W/TINT 26ML ORNG

## (undated) DEVICE — ANTIBACTERIAL UNDYED BRAIDED (POLYGLACTIN 910), SYNTHETIC ABSORBABLE SUTURE: Brand: COATED VICRYL

## (undated) DEVICE — DISPOSABLE BIPOLAR FORCEPS 7 3/4" (19.7CM) SCOVILLE BAYONET, 1.5MM TIP AND 12 FT. (3.6M) CABLE: Brand: KIRWAN

## (undated) DEVICE — PK NEURO SPINE 40

## (undated) DEVICE — ENCORE® LATEX ORTHO SIZE 8, STERILE LATEX POWDER-FREE SURGICAL GLOVE: Brand: ENCORE

## (undated) DEVICE — DRSNG TELFA PAD NONADH STR 1S 3X4IN

## (undated) DEVICE — GLV SURG BIOGEL LTX PF 7

## (undated) DEVICE — ELECTRD BLD EXT EDGE 1P COAT 6.5IN STRL

## (undated) DEVICE — NDL SPINE 22G 31/2IN BLK

## (undated) DEVICE — DRSNG SURESITE WNDW 4X4.5

## (undated) DEVICE — DRP C/ARM 41X74IN

## (undated) DEVICE — SYR CONTRL LUERLOK 10CC

## (undated) DEVICE — DRP MICROSCP LEICA W/GLASS LENS

## (undated) DEVICE — GOWN,NON-REINFORCED,SIRUS,SET IN SLV,XXL: Brand: MEDLINE

## (undated) DEVICE — 3.0MM NEURO (MATCH HEAD) LESS AGGRESSIVE